# Patient Record
Sex: FEMALE | Race: WHITE | Employment: FULL TIME | URBAN - NONMETROPOLITAN AREA
[De-identification: names, ages, dates, MRNs, and addresses within clinical notes are randomized per-mention and may not be internally consistent; named-entity substitution may affect disease eponyms.]

---

## 2021-05-17 ENCOUNTER — HOSPITAL ENCOUNTER (EMERGENCY)
Age: 51
Discharge: HOME | End: 2021-05-18
Attending: STUDENT IN AN ORGANIZED HEALTH CARE EDUCATION/TRAINING PROGRAM
Payer: COMMERCIAL

## 2021-05-17 ENCOUNTER — APPOINTMENT (OUTPATIENT)
Dept: CT IMAGING | Age: 51
End: 2021-05-17
Payer: COMMERCIAL

## 2021-05-17 VITALS
OXYGEN SATURATION: 100 % | SYSTOLIC BLOOD PRESSURE: 123 MMHG | RESPIRATION RATE: 16 BRPM | HEIGHT: 64 IN | TEMPERATURE: 99.7 F | HEART RATE: 91 BPM | WEIGHT: 266 LBS | BODY MASS INDEX: 45.41 KG/M2 | DIASTOLIC BLOOD PRESSURE: 72 MMHG

## 2021-05-17 DIAGNOSIS — Z86.39 HISTORY OF DIABETES MELLITUS: ICD-10-CM

## 2021-05-17 DIAGNOSIS — E83.42 HYPOMAGNESEMIA: ICD-10-CM

## 2021-05-17 DIAGNOSIS — L03.314 CELLULITIS OF RIGHT GROIN: Primary | ICD-10-CM

## 2021-05-17 LAB
A/G RATIO: 1.4 (ref 1.1–2.2)
ALBUMIN SERPL-MCNC: 4.1 G/DL (ref 3.4–5)
ALP BLD-CCNC: 107 U/L (ref 40–129)
ALT SERPL-CCNC: 18 U/L (ref 10–40)
ANION GAP SERPL CALCULATED.3IONS-SCNC: 12 MMOL/L (ref 3–16)
AST SERPL-CCNC: 12 U/L (ref 15–37)
BASOPHILS ABSOLUTE: 0.1 K/UL (ref 0–0.2)
BASOPHILS RELATIVE PERCENT: 0.8 %
BILIRUB SERPL-MCNC: 0.4 MG/DL (ref 0–1)
BUN BLDV-MCNC: 13 MG/DL (ref 7–20)
CALCIUM SERPL-MCNC: 9.5 MG/DL (ref 8.3–10.6)
CHLORIDE BLD-SCNC: 97 MMOL/L (ref 99–110)
CO2: 26 MMOL/L (ref 21–32)
CREAT SERPL-MCNC: <0.5 MG/DL (ref 0.6–1.1)
EOSINOPHILS ABSOLUTE: 0.2 K/UL (ref 0–0.6)
EOSINOPHILS RELATIVE PERCENT: 1.6 %
GFR AFRICAN AMERICAN: >60
GFR NON-AFRICAN AMERICAN: >60
GLOBULIN: 3 G/DL
GLUCOSE BLD-MCNC: 247 MG/DL (ref 70–99)
HCT VFR BLD CALC: 42.8 % (ref 36–48)
HEMOGLOBIN: 14.6 G/DL (ref 12–16)
LYMPHOCYTES ABSOLUTE: 2 K/UL (ref 1–5.1)
LYMPHOCYTES RELATIVE PERCENT: 19 %
MAGNESIUM: 1.5 MG/DL (ref 1.8–2.4)
MCH RBC QN AUTO: 29 PG (ref 26–34)
MCHC RBC AUTO-ENTMCNC: 34 G/DL (ref 31–36)
MCV RBC AUTO: 85.4 FL (ref 80–100)
MONOCYTES ABSOLUTE: 0.6 K/UL (ref 0–1.3)
MONOCYTES RELATIVE PERCENT: 6.1 %
NEUTROPHILS ABSOLUTE: 7.5 K/UL (ref 1.7–7.7)
NEUTROPHILS RELATIVE PERCENT: 72.5 %
PDW BLD-RTO: 13 % (ref 12.4–15.4)
PLATELET # BLD: 212 K/UL (ref 135–450)
PMV BLD AUTO: 9.6 FL (ref 5–10.5)
POTASSIUM REFLEX MAGNESIUM: 3.5 MMOL/L (ref 3.5–5.1)
RBC # BLD: 5.01 M/UL (ref 4–5.2)
SODIUM BLD-SCNC: 135 MMOL/L (ref 136–145)
TOTAL PROTEIN: 7.1 G/DL (ref 6.4–8.2)
WBC # BLD: 10.3 K/UL (ref 4–11)

## 2021-05-17 PROCEDURE — 6360000004 HC RX CONTRAST MEDICATION: Performed by: STUDENT IN AN ORGANIZED HEALTH CARE EDUCATION/TRAINING PROGRAM

## 2021-05-17 PROCEDURE — 85025 COMPLETE CBC W/AUTO DIFF WBC: CPT

## 2021-05-17 PROCEDURE — 6360000002 HC RX W HCPCS: Performed by: STUDENT IN AN ORGANIZED HEALTH CARE EDUCATION/TRAINING PROGRAM

## 2021-05-17 PROCEDURE — 6370000000 HC RX 637 (ALT 250 FOR IP): Performed by: STUDENT IN AN ORGANIZED HEALTH CARE EDUCATION/TRAINING PROGRAM

## 2021-05-17 PROCEDURE — 99284 EMERGENCY DEPT VISIT MOD MDM: CPT

## 2021-05-17 PROCEDURE — 83735 ASSAY OF MAGNESIUM: CPT

## 2021-05-17 PROCEDURE — 36415 COLL VENOUS BLD VENIPUNCTURE: CPT

## 2021-05-17 PROCEDURE — 96365 THER/PROPH/DIAG IV INF INIT: CPT

## 2021-05-17 PROCEDURE — 80053 COMPREHEN METABOLIC PANEL: CPT

## 2021-05-17 PROCEDURE — 74177 CT ABD & PELVIS W/CONTRAST: CPT

## 2021-05-17 PROCEDURE — 87040 BLOOD CULTURE FOR BACTERIA: CPT

## 2021-05-17 RX ORDER — CEPHALEXIN 500 MG/1
500 CAPSULE ORAL 4 TIMES DAILY
Qty: 28 CAPSULE | Refills: 0 | Status: SHIPPED | OUTPATIENT
Start: 2021-05-17 | End: 2021-05-24

## 2021-05-17 RX ORDER — CEPHALEXIN 500 MG/1
500 CAPSULE ORAL ONCE
Status: COMPLETED | OUTPATIENT
Start: 2021-05-18 | End: 2021-05-17

## 2021-05-17 RX ORDER — MAGNESIUM SULFATE 1 G/100ML
1000 INJECTION INTRAVENOUS ONCE
Status: COMPLETED | OUTPATIENT
Start: 2021-05-17 | End: 2021-05-18

## 2021-05-17 RX ORDER — DOXYCYCLINE HYCLATE 100 MG
100 TABLET ORAL 2 TIMES DAILY
Qty: 14 TABLET | Refills: 0 | Status: SHIPPED | OUTPATIENT
Start: 2021-05-17 | End: 2021-05-24

## 2021-05-17 RX ORDER — DOXYCYCLINE HYCLATE 100 MG
100 TABLET ORAL ONCE
Status: COMPLETED | OUTPATIENT
Start: 2021-05-18 | End: 2021-05-17

## 2021-05-17 RX ORDER — LOSARTAN POTASSIUM 25 MG/1
TABLET ORAL
Status: ON HOLD | COMMUNITY
Start: 2021-05-02 | End: 2022-10-14 | Stop reason: HOSPADM

## 2021-05-17 RX ADMIN — DOXYCYCLINE HYCLATE 100 MG: 100 TABLET, COATED ORAL at 23:39

## 2021-05-17 RX ADMIN — IOPAMIDOL 75 ML: 755 INJECTION, SOLUTION INTRAVENOUS at 22:58

## 2021-05-17 RX ADMIN — CEPHALEXIN 500 MG: 500 CAPSULE ORAL at 23:39

## 2021-05-17 RX ADMIN — MAGNESIUM SULFATE HEPTAHYDRATE 1000 MG: 1 INJECTION, SOLUTION INTRAVENOUS at 23:13

## 2021-05-17 ASSESSMENT — PAIN SCALES - GENERAL: PAINLEVEL_OUTOF10: 1

## 2021-05-17 ASSESSMENT — PAIN DESCRIPTION - LOCATION: LOCATION: GROIN

## 2021-05-18 NOTE — ED NOTES
..Patient discharged to home, alert, oriented, skin warm, dry and pink. Denies needs and or questions.  Will follow up as directed, patient encouraged to return for worsening or new symptoms or other concerns       Justine Monaco RN  05/18/21 0023

## 2021-05-18 NOTE — ED PROVIDER NOTES
[facility] EMERGENCY DEPARTMENT      CHIEF COMPLAINT  Abscess (boil on right groin, started friday, hx of boild,  squeezed on / saturday, \" i felt it pop on the inside and now its a big mess\" )    HISTORY OF PRESENT ILLNESS  Erica Castellano is a 53 y.o. female  who presents to the ED complaining of right groin swelling and possible abscess. Patient states that symptoms have been going on for the past 2 to 3 days. She states that she has a history of multiple boils in that area and that she has a habit of popping them. She states that she tried to pop it several days ago and that she felt like it popped on the inside however nothing external drain out of it. She states that since then it has been worsened, has increased in size and also, painful. She denies any associated fevers or chills. She denies any other complaints or concerns. She does have a history of diabetes, states that she does not was consistent with checking her blood sugars or taking her insulin. Denies other complaints or concerns. No other complaints, modifying factors or associated symptoms. I have reviewed the following from the nursing documentation. Past Medical History:   Diagnosis Date    Anemia     Diabetes mellitus ([address]     Hypertension     Thyroid disease     PARTIAL REMOVAL FOR NODULES     Past Surgical History:   Procedure Laterality Date     SECTION      X2    CHOLECYSTECTOMY      HYSTERECTOMY  2016    total vaginal    THYROIDECTOMY Bilateral 2013    Subtotal thyroidectomy, right thyroid lobe, partial left thyroid lobe     Family History   Problem Relation Age of Onset    Diabetes Father     Cancer Paternal Grandmother         OVARION?      Social History     Socioeconomic History    Marital status:      Spouse name: Not on file    Number of children: Not on file    Years of education: Not on file    Highest education level: Not on file   Occupational History    Not on file Tobacco Use    Smoking status: Never Smoker   Substance and Sexual Activity    Alcohol use: No    Drug use: No    Sexual activity: Not on file   Other Topics Concern    Not on file   Social History Narrative    Not on file     Social Determinants of Health     Financial Resource Strain:     Difficulty of Paying Living Expenses:    Food Insecurity:     Worried About Running Out of Food in the Last Year:     [address] in the Last Year:    Transportation Needs:     Lack of Transportation (Medical):  Lack of Transportation (Non-Medical):    Physical Activity:     Days of Exercise per Week:     Minutes of Exercise per Session:    Stress:     Feeling of Stress :    Social Connections:     Frequency of Communication with Friends and Family:     Frequency of Social Gatherings with Friends and Family:     Attends Evangelical Services:     Active Member of Clubs or Organizations:     Attends Club or Organization Meetings:     Marital Status:    Intimate Partner Violence:     Fear of Current or Ex-Partner:     Emotionally Abused:     Physically Abused:     Sexually Abused:      Current Facility-Administered Medications   Medication Dose Route Frequency Provider Last Rate Last Admin    magnesium sulfate 1000 mg in dextrose 5% 100 mL IVPB  1,000 mg Intravenous Once Anitra Magana  mL/hr at 05/17/21 2313 1,000 mg at 05/17/21 2313     Current Outpatient Medications   Medication Sig Dispense Refill    losartan (COZAAR) 25 MG tablet       insulin aspart (NOVOLOG FLEXPEN) 100 UNIT/ML injection pen Inject 4 Units into the skin 3 times daily (before meals)      insulin glargine (LANTUS SOLOSTAR) 100 UNIT/ML injection pen Inject 12 Units into the skin nightly       No Known Allergies    REVIEW OF SYSTEMS  10 systems reviewed, pertinent positives per HPI otherwise noted to be negative.     PHYSICAL EXAM  BP (!) 158/88   Pulse 98   Temp 100.5 °F (38.1 °C) (Oral)   Resp 16   Ht 5' 4\" (1.626 26 21 - 32 mmol/L    Anion Gap 12 3 - 16    Glucose 247 (H) 70 - 99 mg/dL    BUN 13 7 - 20 mg/dL    CREATININE <0.5 (L) 0.6 - 1.1 mg/dL    GFR Non-African American >60 >60    GFR African American >60 >60    Calcium 9.5 8.3 - 10.6 mg/dL    Total Protein 7.1 6.4 - 8.2 g/dL    Albumin 4.1 3.4 - 5.0 g/dL    Albumin/Globulin Ratio 1.4 1.1 - 2.2    Total Bilirubin 0.4 0.0 - 1.0 mg/dL    Alkaline Phosphatase 107 40 - 129 U/L    ALT 18 10 - 40 U/L    AST 12 (L) 15 - 37 U/L    Globulin 3.0 g/dL   Magnesium   Result Value Ref Range    Magnesium 1.50 (L) 1.80 - 2.40 mg/dL     RADIOLOGY  CT ABDOMEN PELVIS W IV CONTRAST Additional Contrast? None   Final Result   Right groin cellulitis without evidence abscess or suspicious adenopathy           ED COURSE/MDM  Patient seen and evaluated. Old records reviewed. Labs and imaging reviewed and results discussed with patient. Patient is a 52-year-old female with history of diabetes, presenting with concerns for right groin pain and swelling for the past several days and possible boil. Full HPI as detailed above for upon arrival in the ED, vitals remarkable for fever but otherwise reassuring. Patient is not tachycardic, appears nontoxic and is resting comfortably no acute distress. Physical exam as detailed above, she does have an area of erythema and induration in her right inguinal area with no crepitus or obvious areas of induration for drainage. Given her vital sign abnormalities, history of diabetes in conjunction with these findings, CT abdomen pelvis was performed which showed right groin cellulitis without evidence of abscess or any other concerning symptoms at this time. Patient's magnesium level was also incidentally found to be slightly low at, she was given IV magnesium repletion here in the department. No other acute concern electrolyte abnormalities. No leukocytosis or anemia. Her glucose is elevated at 247 without evidence of acidosis.   Given her findings,

## 2021-05-22 LAB
BLOOD CULTURE, ROUTINE: NORMAL
CULTURE, BLOOD 2: NORMAL

## 2022-10-07 ENCOUNTER — APPOINTMENT (OUTPATIENT)
Dept: VASCULAR LAB | Age: 52
DRG: 234 | End: 2022-10-07
Attending: INTERNAL MEDICINE
Payer: COMMERCIAL

## 2022-10-07 ENCOUNTER — APPOINTMENT (OUTPATIENT)
Dept: GENERAL RADIOLOGY | Age: 52
End: 2022-10-07
Payer: COMMERCIAL

## 2022-10-07 ENCOUNTER — HOSPITAL ENCOUNTER (EMERGENCY)
Age: 52
Discharge: OTHER INSTITUTION | End: 2022-10-07
Attending: EMERGENCY MEDICINE
Payer: COMMERCIAL

## 2022-10-07 VITALS
SYSTOLIC BLOOD PRESSURE: 102 MMHG | TEMPERATURE: 97.6 F | HEIGHT: 64 IN | BODY MASS INDEX: 43.54 KG/M2 | OXYGEN SATURATION: 97 % | DIASTOLIC BLOOD PRESSURE: 63 MMHG | HEART RATE: 84 BPM | RESPIRATION RATE: 11 BRPM | WEIGHT: 255 LBS

## 2022-10-07 DIAGNOSIS — R73.9 HYPERGLYCEMIA: ICD-10-CM

## 2022-10-07 DIAGNOSIS — R77.8 ELEVATED TROPONIN: ICD-10-CM

## 2022-10-07 DIAGNOSIS — R07.9 CHEST PAIN, UNSPECIFIED TYPE: ICD-10-CM

## 2022-10-07 DIAGNOSIS — I21.4 NSTEMI (NON-ST ELEVATED MYOCARDIAL INFARCTION) (HCC): Primary | ICD-10-CM

## 2022-10-07 PROBLEM — I25.110 CORONARY ARTERY DISEASE INVOLVING NATIVE CORONARY ARTERY OF NATIVE HEART WITH UNSTABLE ANGINA PECTORIS (HCC): Status: ACTIVE | Noted: 2022-10-07

## 2022-10-07 PROBLEM — I25.5 ISCHEMIC CARDIOMYOPATHY: Status: ACTIVE | Noted: 2022-10-07

## 2022-10-07 LAB
A/G RATIO: 1.4 (ref 1.1–2.2)
ALBUMIN SERPL-MCNC: 4.3 G/DL (ref 3.4–5)
ALP BLD-CCNC: 140 U/L (ref 40–129)
ALT SERPL-CCNC: 15 U/L (ref 10–40)
ANION GAP SERPL CALCULATED.3IONS-SCNC: 12 MMOL/L (ref 3–16)
APTT: 27 SEC (ref 23–34.3)
APTT: 34.4 SEC (ref 23–34.3)
AST SERPL-CCNC: 11 U/L (ref 15–37)
BASOPHILS ABSOLUTE: 0 K/UL (ref 0–0.2)
BASOPHILS RELATIVE PERCENT: 0.6 %
BILIRUB SERPL-MCNC: 0.5 MG/DL (ref 0–1)
BUN BLDV-MCNC: 11 MG/DL (ref 7–20)
CALCIUM SERPL-MCNC: 9.9 MG/DL (ref 8.3–10.6)
CHLORIDE BLD-SCNC: 96 MMOL/L (ref 99–110)
CHP ED QC CHECK: NORMAL
CO2: 28 MMOL/L (ref 21–32)
CREAT SERPL-MCNC: <0.5 MG/DL (ref 0.6–1.1)
EKG ATRIAL RATE: 73 BPM
EKG ATRIAL RATE: 91 BPM
EKG DIAGNOSIS: NORMAL
EKG DIAGNOSIS: NORMAL
EKG P AXIS: 20 DEGREES
EKG P AXIS: 30 DEGREES
EKG P-R INTERVAL: 134 MS
EKG P-R INTERVAL: 148 MS
EKG Q-T INTERVAL: 368 MS
EKG Q-T INTERVAL: 402 MS
EKG QRS DURATION: 82 MS
EKG QRS DURATION: 86 MS
EKG QTC CALCULATION (BAZETT): 442 MS
EKG QTC CALCULATION (BAZETT): 452 MS
EKG R AXIS: -23 DEGREES
EKG R AXIS: -29 DEGREES
EKG T AXIS: -26 DEGREES
EKG T AXIS: -27 DEGREES
EKG VENTRICULAR RATE: 73 BPM
EKG VENTRICULAR RATE: 91 BPM
EOSINOPHILS ABSOLUTE: 0.1 K/UL (ref 0–0.6)
EOSINOPHILS RELATIVE PERCENT: 1.2 %
GFR AFRICAN AMERICAN: >60
GFR NON-AFRICAN AMERICAN: >60
GLUCOSE BLD-MCNC: 345 MG/DL
GLUCOSE BLD-MCNC: 345 MG/DL (ref 70–99)
GLUCOSE BLD-MCNC: 360 MG/DL (ref 70–99)
HCT VFR BLD CALC: 41.2 % (ref 36–48)
HEMOGLOBIN: 14.3 G/DL (ref 12–16)
LIPASE: 74 U/L (ref 13–60)
LYMPHOCYTES ABSOLUTE: 1.2 K/UL (ref 1–5.1)
LYMPHOCYTES RELATIVE PERCENT: 22.7 %
MCH RBC QN AUTO: 29.5 PG (ref 26–34)
MCHC RBC AUTO-ENTMCNC: 34.6 G/DL (ref 31–36)
MCV RBC AUTO: 85.4 FL (ref 80–100)
MONOCYTES ABSOLUTE: 0.3 K/UL (ref 0–1.3)
MONOCYTES RELATIVE PERCENT: 5.8 %
NEUTROPHILS ABSOLUTE: 3.7 K/UL (ref 1.7–7.7)
NEUTROPHILS RELATIVE PERCENT: 69.7 %
PDW BLD-RTO: 13.1 % (ref 12.4–15.4)
PERFORMED ON: ABNORMAL
PLATELET # BLD: 214 K/UL (ref 135–450)
PMV BLD AUTO: 9.5 FL (ref 5–10.5)
POTASSIUM REFLEX MAGNESIUM: 3.9 MMOL/L (ref 3.5–5.1)
RBC # BLD: 4.83 M/UL (ref 4–5.2)
SODIUM BLD-SCNC: 136 MMOL/L (ref 136–145)
TOTAL PROTEIN: 7.4 G/DL (ref 6.4–8.2)
TROPONIN: 0.07 NG/ML
TROPONIN: 0.09 NG/ML
TROPONIN: 0.21 NG/ML
WBC # BLD: 5.3 K/UL (ref 4–11)

## 2022-10-07 PROCEDURE — 80053 COMPREHEN METABOLIC PANEL: CPT

## 2022-10-07 PROCEDURE — 93010 ELECTROCARDIOGRAM REPORT: CPT | Performed by: INTERNAL MEDICINE

## 2022-10-07 PROCEDURE — 84484 ASSAY OF TROPONIN QUANT: CPT

## 2022-10-07 PROCEDURE — 6370000000 HC RX 637 (ALT 250 FOR IP): Performed by: STUDENT IN AN ORGANIZED HEALTH CARE EDUCATION/TRAINING PROGRAM

## 2022-10-07 PROCEDURE — 6360000002 HC RX W HCPCS: Performed by: EMERGENCY MEDICINE

## 2022-10-07 PROCEDURE — 85730 THROMBOPLASTIN TIME PARTIAL: CPT

## 2022-10-07 PROCEDURE — 96365 THER/PROPH/DIAG IV INF INIT: CPT

## 2022-10-07 PROCEDURE — 96366 THER/PROPH/DIAG IV INF ADDON: CPT

## 2022-10-07 PROCEDURE — 99284 EMERGENCY DEPT VISIT MOD MDM: CPT | Performed by: INTERNAL MEDICINE

## 2022-10-07 PROCEDURE — 93005 ELECTROCARDIOGRAM TRACING: CPT | Performed by: EMERGENCY MEDICINE

## 2022-10-07 PROCEDURE — 6370000000 HC RX 637 (ALT 250 FOR IP): Performed by: EMERGENCY MEDICINE

## 2022-10-07 PROCEDURE — 85025 COMPLETE CBC W/AUTO DIFF WBC: CPT

## 2022-10-07 PROCEDURE — 36415 COLL VENOUS BLD VENIPUNCTURE: CPT

## 2022-10-07 PROCEDURE — 83690 ASSAY OF LIPASE: CPT

## 2022-10-07 PROCEDURE — 99285 EMERGENCY DEPT VISIT HI MDM: CPT

## 2022-10-07 PROCEDURE — 71046 X-RAY EXAM CHEST 2 VIEWS: CPT

## 2022-10-07 RX ORDER — HEPARIN SODIUM 1000 [USP'U]/ML
2000 INJECTION, SOLUTION INTRAVENOUS; SUBCUTANEOUS PRN
Status: DISCONTINUED | OUTPATIENT
Start: 2022-10-07 | End: 2022-10-07 | Stop reason: HOSPADM

## 2022-10-07 RX ORDER — HEPARIN SODIUM 1000 [USP'U]/ML
4000 INJECTION, SOLUTION INTRAVENOUS; SUBCUTANEOUS ONCE
Status: COMPLETED | OUTPATIENT
Start: 2022-10-07 | End: 2022-10-07

## 2022-10-07 RX ORDER — HEPARIN SODIUM 10000 [USP'U]/100ML
950 INJECTION, SOLUTION INTRAVENOUS CONTINUOUS
Status: DISCONTINUED | OUTPATIENT
Start: 2022-10-07 | End: 2022-10-07 | Stop reason: HOSPADM

## 2022-10-07 RX ORDER — HEPARIN SODIUM 1000 [USP'U]/ML
4000 INJECTION, SOLUTION INTRAVENOUS; SUBCUTANEOUS PRN
Status: DISCONTINUED | OUTPATIENT
Start: 2022-10-07 | End: 2022-10-07 | Stop reason: HOSPADM

## 2022-10-07 RX ORDER — ATORVASTATIN CALCIUM 40 MG/1
80 TABLET, FILM COATED ORAL ONCE
Status: COMPLETED | OUTPATIENT
Start: 2022-10-07 | End: 2022-10-07

## 2022-10-07 RX ORDER — ASPIRIN 325 MG
325 TABLET ORAL ONCE
Status: COMPLETED | OUTPATIENT
Start: 2022-10-07 | End: 2022-10-07

## 2022-10-07 RX ORDER — LORAZEPAM 0.5 MG/1
0.5 TABLET ORAL ONCE
Status: COMPLETED | OUTPATIENT
Start: 2022-10-07 | End: 2022-10-07

## 2022-10-07 RX ADMIN — LORAZEPAM 0.5 MG: 0.5 TABLET ORAL at 04:42

## 2022-10-07 RX ADMIN — HEPARIN SODIUM 4000 UNITS: 1000 INJECTION INTRAVENOUS; SUBCUTANEOUS at 04:24

## 2022-10-07 RX ADMIN — ASPIRIN 325 MG: 325 TABLET ORAL at 04:20

## 2022-10-07 RX ADMIN — INSULIN HUMAN 10 UNITS: 100 INJECTION, SOLUTION PARENTERAL at 05:28

## 2022-10-07 RX ADMIN — METOPROLOL TARTRATE 25 MG: 25 TABLET, FILM COATED ORAL at 09:12

## 2022-10-07 RX ADMIN — ATORVASTATIN CALCIUM 80 MG: 40 TABLET, FILM COATED ORAL at 09:12

## 2022-10-07 RX ADMIN — HEPARIN SODIUM 950 UNITS/HR: 10000 INJECTION, SOLUTION INTRAVENOUS at 04:27

## 2022-10-07 ASSESSMENT — PAIN SCALES - GENERAL
PAINLEVEL_OUTOF10: 3
PAINLEVEL_OUTOF10: 0

## 2022-10-07 ASSESSMENT — PAIN - FUNCTIONAL ASSESSMENT: PAIN_FUNCTIONAL_ASSESSMENT: 0-10

## 2022-10-07 ASSESSMENT — PAIN DESCRIPTION - LOCATION
LOCATION: CHEST
LOCATION: CHEST

## 2022-10-07 ASSESSMENT — ENCOUNTER SYMPTOMS
RHINORRHEA: 0
COUGH: 0
VOMITING: 0
CHEST TIGHTNESS: 0
SHORTNESS OF BREATH: 0
DIARRHEA: 0
BACK PAIN: 0
ABDOMINAL PAIN: 0

## 2022-10-07 ASSESSMENT — PAIN DESCRIPTION - ORIENTATION: ORIENTATION: LEFT;RIGHT;MID

## 2022-10-07 ASSESSMENT — PAIN DESCRIPTION - FREQUENCY: FREQUENCY: INTERMITTENT

## 2022-10-07 ASSESSMENT — PAIN DESCRIPTION - DESCRIPTORS: DESCRIPTORS: PRESSURE;DISCOMFORT;TIGHTNESS

## 2022-10-07 ASSESSMENT — PAIN DESCRIPTION - PAIN TYPE: TYPE: ACUTE PAIN

## 2022-10-07 NOTE — ED NOTES
Room at [address] [place] [address]    Report number is [phone]    [address]  10/07/22 8157

## 2022-10-07 NOTE — ED NOTES
2089- Call placed to transfer center for [facility].      Jovon Watkins  10/07/22 0422

## 2022-10-07 NOTE — ED NOTES
Cath lab updated that APTT drawn at 1008 results still pending at time of transfer. Pt is gowned, alert, oriented, has patent PIV at time of transfer. Family is at bedside for transfer. Hep gtt continues at 9.5 ml/hr.  Cheryl Delacruz, RN       Cheryl Delacruz RN  10/07/22 1039

## 2022-10-07 NOTE — CONSULTS
Pharmacy to Manage Heparin Infusion per [facility]    Dx: ACS  Pt wt = 79.1 kg - adjusted. Baseline aPTT = Pending. Oral factor Xa-inhibitors may alter and elevate anti-Xa levels used for unfractionated heparin monitoring. As a result, anti-Xa monitoring is not accurate while Xa-inhibitor activity is detectable. Utilize aPTT monitoring when patient received an oral factor Xa-inhibitor (apixaban, betrixaban, edoxaban or rivaroxaban) within 72 hours prior to admission (please document last administration time). The goal is to allow a washout of oral factor Xa-inhibitors by using aPTT for 72 hours, then change to ant-Xa levels for UFH. Heparin (weight-based) Infusion: CAD/STEMI/NSTEMI/UA/AFib)   Heparin 60 units/kg IVP bolus (max 4,000 units) followed by Heparin infusion at 12 units/kg/hr (recommended max initial rate: 1000 units/hr). Recheck anti-Xa (unless aPTT being used) in 6 hours. Goal anti-Xa 0.3-0.7 IU/mL  Goal aPTT =  seconds.   Yola Steele, PharmD  10/7/2022 4:04 AM
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
## 2022-10-07 NOTE — ED PROVIDER NOTES
11:49 AM: I discussed the history, physical examination, laboratory and imaging studies, and treatment plan with Dr. Fernando Johnson. Chuck Mistry was signed out to me in stable condition. Please see Dr. Regine Cristina documentation for details of their history, physical, and laboratory studies. Upon re-examination, a summary of Keila Medrano's history, physical examination, and studies are as follows:      ED Course as of 10/10/22 1149   Fri Oct 07, 2022   0756 NSTEMI [ER]   5082 Repeat troponin had up trended to 0.21. [ER]   1848 Received call from cardiology who reviewed EKGs and did have specific concerns. They recommended p.o. metoprolol and Lipitor. They will see the patient and stated they would contact interventional cardiology. [ER]      ED Course User Index  [ER] MD Dr. Radha Holland evaluated the patient in the emergency department. He spoke to interventional radiology and decision made to plan for cardiac catheterization today. Attempting to get patient over to [facility] as quickly as possible. Dr. Radha Simmons did not feel the patient required air transport, stated that soon as possible ground transportation was appropriate. Patient remained hemodynamically stable in the emergency department and was transferred to [facility].      Mary Gallardo MD  10/10/22 0021

## 2022-10-07 NOTE — ED NOTES
5591- Dr. Jf Moyer returned the phone call and spoke to Dr. Farida Randhawa.      Elías Gunn  10/07/22 3647

## 2022-10-07 NOTE — CONSULTS
[address] [place] [address]  [phone]        Reason for Consultation/Chief Complaint: \"I have been having chest pain. \"  Consulted for abn EKG and chest pain    History of Present Illness:  Genaro Neves is a 47 y.o. patient who presented to [facility] 10/7/2022 with complaints of chest pain. She has PMH of anemia, DM x 20 years, HTN, s/p thyroidectomy. No past cardiac testing. Note she does NOT take any medications. Prescribed meds by PCP Dr. Star Fernandez including insulin but did not want to take. She c/o chest pain worsening over the past 4 months. Reports tightness across chest radiating into throat and shoulders; occurs randomly; lasted few minutes; no assoc symptoms. However, last 2 weeks pain has increased in frequency and intensity. She was awakened with CP from sleep that would not go away prompting ER visit. Note CXR was unremarkable. Note EKG NSR; ST elevation high lateral leads; inferior ST changes c/w possible ischemia. Second EKG similar findings. LABS: , K 3.9, Cl 96, BUN/Cr 11/ <0.5, Mg 1.5, ALT 18, AST 12, H/H 14.3/41. 2. Germania 0.09, 0.07, 0.21. Started on heparin gtt and given adult aspirin. Patient with no complaints of chest pain, SOB, palpitations, dizziness, edema, or orthopnea/PND. I have been asked to provide consultation regarding further management and testing. Past Medical History:   has a past medical history of Anemia, Diabetes mellitus ([address] Hypertension, and Thyroid disease. Surgical History:   has a past surgical history that includes Cholecystectomy;  section; Thyroidectomy (Bilateral, 2013); and Hysterectomy (2016). Social History:   reports that she has never smoked. She has never used smokeless tobacco. She reports that she does not drink alcohol and does not use drugs. Family History:  family history includes Cancer in her paternal grandmother; Diabetes in her father.      Home Medications:  Were reviewed and are listed in nursing record. and/or listed below  Prior to Admission medications    Medication Sig Start Date End Date Taking? Authorizing Provider   losartan (COZAAR) 25 MG tablet  5/2/21   Historical Provider, MD   insulin aspart (NOVOLOG) 100 UNIT/ML injection pen Inject 4 Units into the skin 3 times daily (before meals)  Patient not taking: Reported on 10/7/2022    Historical Provider, MD   insulin glargine (LANTUS;BASAGLAR) 100 UNIT/ML injection pen Inject 12 Units into the skin nightly  Patient not taking: Reported on 10/7/2022    Historical Provider, MD        Allergies:  Patient has no known allergies.      Review of Systems:   12 point ROS negative in all areas as listed below except as in [place]  Constitutional, EENT, Cardiovascular, pulmonary, GI, , Musculoskeletal, skin, neurological, hematological, endocrine, Psychiatric    Physical Examination:    Vitals:    10/07/22 0630   BP: 117/65   Pulse: 85   Resp: 15   Temp:    SpO2: 92%    Weight: 255 lb (115.7 kg)         General Appearance:  Alert, cooperative, no distress, appears stated age   Head:  Normocephalic, without obvious abnormality, atraumatic   Eyes:  PERRL, conjunctiva/corneas clear       Nose: Nares normal, no drainage or sinus tenderness   Throat: Lips, mucosa, and tongue normal   Neck: Supple, symmetrical, trachea midline, no adenopathy, thyroid: not enlarged, symmetric, no tenderness/mass/nodules, no carotid bruit or JVD       Lungs:   Clear to auscultation bilaterally, respirations unlabored   Chest Wall:  No tenderness or deformity   Heart:  Regular rate and rhythm, S1, S2 normal, no murmur, rub or gallop   Abdomen:   Soft, non-tender, bowel sounds active all four quadrants,  no masses, no organomegaly           Extremities: Extremities normal, atraumatic, no cyanosis or edema   Pulses: 2+ and symmetric   Skin: Skin color, texture, turgor normal, no rashes or lesions   Pysch: Normal mood and affect   Neurologic: Normal gross motor and sensory exam. Labs  CBC:   Lab Results   Component Value Date/Time    WBC 5.3 10/07/2022 03:03 AM    RBC 4.83 10/07/2022 03:03 AM    HGB 14.3 10/07/2022 03:03 AM    HCT 41.2 10/07/2022 03:03 AM    MCV 85.4 10/07/2022 03:03 AM    RDW 13.1 10/07/2022 03:03 AM     10/07/2022 03:03 AM     CMP:    Lab Results   Component Value Date/Time     10/07/2022 03:03 AM    K 3.9 10/07/2022 03:03 AM    CL 96 10/07/2022 03:03 AM    CO2 28 10/07/2022 03:03 AM    BUN 11 10/07/2022 03:03 AM    CREATININE <0.5 10/07/2022 03:03 AM    GFRAA >60 10/07/2022 03:03 AM    GFRAA >60 03/29/2010 10:39 AM    AGRATIO 1.4 10/07/2022 03:03 AM    LABGLOM >60 10/07/2022 03:03 AM    GLUCOSE 345 10/07/2022 05:23 AM    PROT 7.4 10/07/2022 03:03 AM    PROT 7.1 03/29/2010 10:39 AM    CALCIUM 9.9 10/07/2022 03:03 AM    BILITOT 0.5 10/07/2022 03:03 AM    ALKPHOS 140 10/07/2022 03:03 AM    AST 11 10/07/2022 03:03 AM    ALT 15 10/07/2022 03:03 AM     PT/INR:  No results found for: PTINR  Lab Results   Component Value Date    TROPONINI 0.21 (H) 10/07/2022       EKG:  I have reviewed EKG with the following interpretation:  Impression:  See HPI    Assessment  Shahram Zuñiga is a 47 y.o. patient who presented to [facility] 10/7/2022 with complaints of chest pain. She has PMH of anemia, DM x 20 years, HTN, s/p thyroidectomy. No past cardiac testing. Note she does NOT take any medications. Prescribed meds by PCP Dr. Galilea Barriga including insulin but did not want to take. She c/o chest pain worsening over the past 4 months. Reports tightness across chest radiating into throat and shoulders; occurs randomly; lasted few minutes; no assoc symptoms. However, last 2 weeks pain has increased in frequency and intensity. She was awakened with CP from sleep that would not go away prompting ER visit. Note CXR was unremarkable. Note EKG NSR; ST elevation high lateral leads; inferior ST changes c/w possible ischemia. Second EKG similar findings.   LABS: , K 3.9, Cl 96, BUN/Cr 11/ <0.5, Mg 1.5, ALT 18, AST 12, H/H 14.3/41. 2. Germania 0.09, 0.07, 0.21. Started on heparin gtt and given adult aspirin. Diagnosis of NSTEMI in middle-aged female with untreated DM, HTN for years. Recs:  EKG without definite criteria for STEMI but concerning lateral ST changes. Given Germania elevation and clinical story she definitely has ACS/NSTEMI. Continue heparin gtt and baby aspirin for AC. She received adult x 1 in ER. Start metoprolol 25mg BID and lipitor 80mg qd  Based on these findings I recommend left heart cath for definitive evaluation of coronary arteries. Risks, benefits, expectations, and alternative treatments were discussed. Questions appropriately answered. Keila Day agrees to proceed and verbalized understanding. I discussed case with interventional partner and will plan for LHC today. She is CP free now. Thank you for allowing to us to participate in the care or Keila Day. Further evaluation will be based upon the patient's clinical course and testing results.

## 2022-10-07 NOTE — ED NOTES
Report given to [facility] for transfer of care.      101 Danis Almodovar RN  10/07/22 3891

## 2022-10-08 ENCOUNTER — ANESTHESIA EVENT (OUTPATIENT)
Dept: OPERATING ROOM | Age: 52
DRG: 234 | End: 2022-10-08
Payer: COMMERCIAL

## 2022-10-08 PROBLEM — I25.10 CAD, MULTIPLE VESSEL: Status: ACTIVE | Noted: 2022-10-08

## 2022-10-09 ASSESSMENT — ENCOUNTER SYMPTOMS
COUGH: 0
VOMITING: 0
ABDOMINAL PAIN: 0
CHEST TIGHTNESS: 0
RHINORRHEA: 0
SHORTNESS OF BREATH: 0
BACK PAIN: 0
DIARRHEA: 0

## 2022-10-10 ENCOUNTER — APPOINTMENT (OUTPATIENT)
Dept: GENERAL RADIOLOGY | Age: 52
DRG: 234 | End: 2022-10-10
Attending: INTERNAL MEDICINE
Payer: COMMERCIAL

## 2022-10-10 ENCOUNTER — ANESTHESIA (OUTPATIENT)
Dept: OPERATING ROOM | Age: 52
DRG: 234 | End: 2022-10-10
Payer: COMMERCIAL

## 2022-10-10 PROCEDURE — 6360000002 HC RX W HCPCS: Performed by: ANESTHESIOLOGY

## 2022-10-10 PROCEDURE — 76942 ECHO GUIDE FOR BIOPSY: CPT | Performed by: ANESTHESIOLOGY

## 2022-10-10 PROCEDURE — 71045 X-RAY EXAM CHEST 1 VIEW: CPT

## 2022-10-10 PROCEDURE — 2580000003 HC RX 258: Performed by: ANESTHESIOLOGY

## 2022-10-10 PROCEDURE — 6360000002 HC RX W HCPCS: Performed by: NURSE PRACTITIONER

## 2022-10-10 PROCEDURE — 2500000003 HC RX 250 WO HCPCS: Performed by: ANESTHESIOLOGY

## 2022-10-10 PROCEDURE — 6370000000 HC RX 637 (ALT 250 FOR IP): Performed by: ANESTHESIOLOGY

## 2022-10-10 RX ORDER — BUPIVACAINE HYDROCHLORIDE 2.5 MG/ML
INJECTION, SOLUTION EPIDURAL; INFILTRATION; INTRACAUDAL
Status: COMPLETED | OUTPATIENT
Start: 2022-10-10 | End: 2022-10-10

## 2022-10-10 RX ORDER — ROCURONIUM BROMIDE 10 MG/ML
INJECTION, SOLUTION INTRAVENOUS PRN
Status: DISCONTINUED | OUTPATIENT
Start: 2022-10-10 | End: 2022-10-10 | Stop reason: SDUPTHER

## 2022-10-10 RX ORDER — SODIUM CHLORIDE, SODIUM LACTATE, POTASSIUM CHLORIDE, CALCIUM CHLORIDE 600; 310; 30; 20 MG/100ML; MG/100ML; MG/100ML; MG/100ML
INJECTION, SOLUTION INTRAVENOUS CONTINUOUS PRN
Status: DISCONTINUED | OUTPATIENT
Start: 2022-10-10 | End: 2022-10-10 | Stop reason: SDUPTHER

## 2022-10-10 RX ORDER — MIDAZOLAM HYDROCHLORIDE 1 MG/ML
INJECTION INTRAMUSCULAR; INTRAVENOUS PRN
Status: DISCONTINUED | OUTPATIENT
Start: 2022-10-10 | End: 2022-10-10 | Stop reason: SDUPTHER

## 2022-10-10 RX ORDER — SODIUM CHLORIDE 9 MG/ML
INJECTION, SOLUTION INTRAVENOUS CONTINUOUS PRN
Status: DISCONTINUED | OUTPATIENT
Start: 2022-10-10 | End: 2022-10-10 | Stop reason: SDUPTHER

## 2022-10-10 RX ORDER — AMINOCAPROIC ACID 250 MG/ML
INJECTION, SOLUTION INTRAVENOUS PRN
Status: DISCONTINUED | OUTPATIENT
Start: 2022-10-10 | End: 2022-10-10 | Stop reason: SDUPTHER

## 2022-10-10 RX ORDER — ETOMIDATE 2 MG/ML
INJECTION INTRAVENOUS PRN
Status: DISCONTINUED | OUTPATIENT
Start: 2022-10-10 | End: 2022-10-10 | Stop reason: SDUPTHER

## 2022-10-10 RX ORDER — CALCIUM CHLORIDE 100 MG/ML
INJECTION INTRAVENOUS; INTRAVENTRICULAR PRN
Status: DISCONTINUED | OUTPATIENT
Start: 2022-10-10 | End: 2022-10-10 | Stop reason: SDUPTHER

## 2022-10-10 RX ORDER — PHENYLEPHRINE HCL IN 0.9% NACL 1 MG/10 ML
SYRINGE (ML) INTRAVENOUS PRN
Status: DISCONTINUED | OUTPATIENT
Start: 2022-10-10 | End: 2022-10-10 | Stop reason: SDUPTHER

## 2022-10-10 RX ORDER — HEPARIN SODIUM 1000 [USP'U]/ML
INJECTION, SOLUTION INTRAVENOUS; SUBCUTANEOUS PRN
Status: DISCONTINUED | OUTPATIENT
Start: 2022-10-10 | End: 2022-10-10 | Stop reason: SDUPTHER

## 2022-10-10 RX ORDER — PROTAMINE SULFATE 10 MG/ML
INJECTION, SOLUTION INTRAVENOUS PRN
Status: DISCONTINUED | OUTPATIENT
Start: 2022-10-10 | End: 2022-10-10 | Stop reason: SDUPTHER

## 2022-10-10 RX ORDER — FENTANYL CITRATE 0.05 MG/ML
INJECTION, SOLUTION INTRAMUSCULAR; INTRAVENOUS PRN
Status: DISCONTINUED | OUTPATIENT
Start: 2022-10-10 | End: 2022-10-10 | Stop reason: SDUPTHER

## 2022-10-10 RX ORDER — KETAMINE HYDROCHLORIDE 100 MG/ML
INJECTION, SOLUTION INTRAMUSCULAR; INTRAVENOUS PRN
Status: DISCONTINUED | OUTPATIENT
Start: 2022-10-10 | End: 2022-10-10 | Stop reason: SDUPTHER

## 2022-10-10 RX ADMIN — MIDAZOLAM HYDROCHLORIDE 4 MG: 2 INJECTION, SOLUTION INTRAMUSCULAR; INTRAVENOUS at 11:25

## 2022-10-10 RX ADMIN — MIDAZOLAM HYDROCHLORIDE 4 MG: 2 INJECTION, SOLUTION INTRAMUSCULAR; INTRAVENOUS at 07:44

## 2022-10-10 RX ADMIN — ROCURONIUM BROMIDE 100 MG: 10 SOLUTION INTRAVENOUS at 07:54

## 2022-10-10 RX ADMIN — KETAMINE HYDROCHLORIDE 50 MG: 100 INJECTION INTRAMUSCULAR; INTRAVENOUS at 07:54

## 2022-10-10 RX ADMIN — PROTAMINE SULFATE 450 MG: 10 INJECTION, SOLUTION INTRAVENOUS at 11:08

## 2022-10-10 RX ADMIN — DEXTROSE MONOHYDRATE 1 MCG/MIN: 50 INJECTION, SOLUTION INTRAVENOUS at 11:33

## 2022-10-10 RX ADMIN — Medication 1500 MG: at 08:16

## 2022-10-10 RX ADMIN — BUPIVACAINE HYDROCHLORIDE 20 ML: 2.5 INJECTION, SOLUTION EPIDURAL; INFILTRATION; INTRACAUDAL; PERINEURAL at 08:04

## 2022-10-10 RX ADMIN — SUGAMMADEX 200 MG: 100 INJECTION, SOLUTION INTRAVENOUS at 12:00

## 2022-10-10 RX ADMIN — SODIUM CHLORIDE: 9 INJECTION, SOLUTION INTRAVENOUS at 08:04

## 2022-10-10 RX ADMIN — Medication 100 MCG: at 11:35

## 2022-10-10 RX ADMIN — HEPARIN SODIUM 46000 UNITS: 1000 INJECTION, SOLUTION INTRAVENOUS; SUBCUTANEOUS at 09:34

## 2022-10-10 RX ADMIN — CALCIUM CHLORIDE 1 G: 100 INJECTION, SOLUTION INTRAVENOUS at 11:08

## 2022-10-10 RX ADMIN — SODIUM CHLORIDE 0.5 MCG/KG/HR: 9 INJECTION, SOLUTION INTRAVENOUS at 11:05

## 2022-10-10 RX ADMIN — SODIUM CHLORIDE, SODIUM LACTATE, POTASSIUM CHLORIDE, AND CALCIUM CHLORIDE: .6; .31; .03; .02 INJECTION, SOLUTION INTRAVENOUS at 07:44

## 2022-10-10 RX ADMIN — AMINOCAPROIC ACID 1 G/HR: 250 INJECTION, SOLUTION INTRAVENOUS at 08:21

## 2022-10-10 RX ADMIN — ROCURONIUM BROMIDE 50 MG: 10 SOLUTION INTRAVENOUS at 09:17

## 2022-10-10 RX ADMIN — KETAMINE HYDROCHLORIDE 50 MG: 100 INJECTION INTRAMUSCULAR; INTRAVENOUS at 11:25

## 2022-10-10 RX ADMIN — FENTANYL CITRATE 250 MCG: 50 INJECTION, SOLUTION INTRAMUSCULAR; INTRAVENOUS at 07:54

## 2022-10-10 RX ADMIN — SODIUM CHLORIDE 6 UNITS/HR: 9 INJECTION, SOLUTION INTRAVENOUS at 08:36

## 2022-10-10 RX ADMIN — INSULIN HUMAN 6 UNITS: 100 INJECTION, SOLUTION PARENTERAL at 08:36

## 2022-10-10 RX ADMIN — AMINOCAPROIC ACID 5000 MG: 250 INJECTION, SOLUTION INTRAVENOUS at 08:15

## 2022-10-10 RX ADMIN — FENTANYL CITRATE 100 MCG: 50 INJECTION, SOLUTION INTRAMUSCULAR; INTRAVENOUS at 11:14

## 2022-10-10 RX ADMIN — CEFAZOLIN 2000 MG: 10 INJECTION, POWDER, FOR SOLUTION INTRAVENOUS at 07:59

## 2022-10-10 RX ADMIN — ETOMIDATE 20 MG: 2 INJECTION INTRAVENOUS at 07:54

## 2022-10-10 RX ADMIN — FENTANYL CITRATE 50 MCG: 50 INJECTION, SOLUTION INTRAMUSCULAR; INTRAVENOUS at 11:19

## 2022-10-10 RX ADMIN — FENTANYL CITRATE 100 MCG: 50 INJECTION, SOLUTION INTRAMUSCULAR; INTRAVENOUS at 09:08

## 2022-10-10 RX ADMIN — FENTANYL CITRATE 150 MCG: 50 INJECTION, SOLUTION INTRAMUSCULAR; INTRAVENOUS at 08:39

## 2022-10-10 RX ADMIN — INSULIN HUMAN 6 UNITS: 100 INJECTION, SOLUTION PARENTERAL at 10:38

## 2022-10-10 RX ADMIN — ROCURONIUM BROMIDE 50 MG: 10 SOLUTION INTRAVENOUS at 11:06

## 2022-10-10 NOTE — ANESTHESIA PROCEDURE NOTES
Procedure Performed: JOHN       Start Time:  10/10/2022 8:16 AM       End Time:   10/10/2022 8:26 AM    Preanesthesia Checklist:  Patient identified, IV assessed, risks and benefits discussed, monitors and equipment assessed, procedure being performed at surgeon's request and anesthesia consent obtained. General Procedure Information  Diagnostic Indications for Echo:  hemodynamic monitoring  Physician Requesting Echo: Roberto Cortés MD  Location performed:  OR  Intubated  Bite block placed  Heart visualized  Probe Insertion:  Easy  Probe Type:  3D  Modalities:  2D only    Echocardiographic and Doppler Measurements    Ventricles    Right Ventricle:  Cavity size normal.  Hypertrophy not present. Thrombus not present. Left Ventricle:  Cavity size normal.  Hypertrophy not present. Thrombus not present. Valves    Aortic Valve: Annulus normal.  Stenosis not present. Regurgitation none. Leaflets normal.  Leaflet motions normal.      Mitral Valve: Annulus normal.  Stenosis not present. Regurgitation mild. Leaflets normal.  Leaflet motions normal.      Tricuspid Valve: Annulus normal.  Stenosis not present. Regurgitation none. Leaflets normal.  Leaflet motions normal.    Pulmonic Valve: Annulus normal.  Stenosis not present. Regurgitation none. Aorta    Ascending Aorta:  Size normal.    Aortic Arch:  Size normal.    Descending Aorta:  Size normal.        Atria    Right Atrium:  Size normal.  Spontaneous echo contrast not present. Thrombus not present. Tumor not present. Device not present. Left Atrium:  Size normal.  Spontaneous echo contrast not present. Thrombus not present. Tumor not present. Device not present.     Left atrial appendage normal.      Septa    Atrial Septum:  Intra-atrial septal morphology normal.      Ventricular Septum:  Intra-ventricular septum morphology normal.          Other Findings  Pericardium:  pericardial effusion  Pleural Effusion:  none  Pulmonary Arteries:  normal      Anesthesia Information  Performed Personally    Post Intervention Follow-up Study  Aortic Function: unchangedMitral Function: unchangedTricuspid Function: unchangedNoneComments: EF 55%

## 2022-10-10 NOTE — ANESTHESIA PROCEDURE NOTES
Peripheral Block    Patient location during procedure: OR  Reason for block: post-op pain management and at surgeon's request  Start time: 10/10/2022 8:04 AM  End time: 10/10/2022 8:07 AM  Staffing  Performed: anesthesiologist   Anesthesiologist: Raza Diaz MD  Preanesthetic Checklist  Completed: patient identified, IV checked, site marked, risks and benefits discussed, surgical/procedural consents, equipment checked, pre-op evaluation, timeout performed, anesthesia consent given, oxygen available and monitors applied/VS acknowledged  Peripheral Block   Patient position: sitting  Prep: ChloraPrep  Provider prep: mask and sterile gloves  Patient monitoring: cardiac monitor, continuous pulse ox, frequent blood pressure checks and IV access  Block type: PECS II  Laterality: bilateral  Injection technique: single-shot  Guidance: ultrasound guided  Local infiltration: lidocaine  Infiltration strength: 1 %  Local infiltration: lidocaine  Dose: 3 mL    Needle   Needle type: insulated echogenic nerve stimulator needle   Needle gauge: 21 G  Needle localization: ultrasound guidance  Needle length: 10 cm  Assessment   Injection assessment: negative aspiration for heme, no paresthesia on injection and local visualized surrounding nerve on ultrasound  Paresthesia pain: none  Slow fractionated injection: yes  Hemodynamics: stable  Real-time US image taken/store: yes  Outcomes: uncomplicated and patient tolerated procedure well    Additional Notes        Medications Administered  bupivacaine (PF) 0.25 % - Perineural   20 mL - 10/10/2022 8:04:00 AM  bupivacaine liposome 1.3 % - Perineural   20 mL - 10/10/2022 8:04:00 AM

## 2022-10-10 NOTE — ANESTHESIA PROCEDURE NOTES
Central Venous Line:    A central venous line was placed using ultrasound guidance and surface landmarks, in the OR for the following indication(s): central venous access and CVP monitoring. 10/10/2022 8:00 AM10/10/2022 8:04 AM    Sterility preparation included the following: hand hygiene performed prior to procedure, maximum sterile barriers used and sterile technique used to drape from head to toe. The patient was placed in Trendelenburg position. The right internal jugular vein was prepped. The site was prepped with Chloraprep. A 9 Fr (size), 20 (length), introducer triple lumen was placed. During the procedure, the following specific steps were taken: target vein identified, needle advanced into vein and blood aspirated and guidewire advanced into vein. During the procedure the patient experienced: patient tolerated procedure well with no complications and EBL < 5mL.       Outcomes: uncomplicatedno  Anesthesia type: general..No  Staffing  Performed: Anesthesiologist   Anesthesiologist: Nat Mclain MD  Preanesthetic Checklist  Completed: patient identified, timeout performed and monitors applied/VS acknowledged

## 2022-10-10 NOTE — ANESTHESIA POSTPROCEDURE EVALUATION
Department of Anesthesiology  Postprocedure Note    Patient: Venkatesh Serrato  MRN: [id number]  YOB: 1970  Date of evaluation: 10/10/2022      Procedure Summary     Date: 10/10/22 Room / Location: [address] / [facility]    Anesthesia Start: 4479 Anesthesia Stop: 1766    Procedure: CORONARY ARTERY BYPASS GRAFT TIMES THREE WITH POSSIBLE LEFT ATRIAL APPENDAGE CLIP PLACEMENT, JOHN, TEMPORARY PACING WIRES,  INTERNAL MAMMARY ARTERY AND SAPHNEOUS VEIN GRAFT, EDARTERECTOMY, BILATERAL PECTORALIS NERVE BLOCK FOR PAIN CONTROL (Chest) Diagnosis:       Coronary artery disease, unspecified vessel or lesion type, unspecified whether angina present, unspecified whether native or transplanted heart      (Coronary artery disease, unspecified vessel or lesion type, unspecified whether angina present, unspecified whether native or transplanted heart [I25.10])    Surgeons: Addy Gottlieb MD Responsible Provider: Caridad Casarez MD    Anesthesia Type: general ASA Status: 4          Anesthesia Type: No value filed.     Micah Phase I:      Micah Phase II:        Anesthesia Post Evaluation    Patient location during evaluation: ICU  Patient participation: complete - patient cannot participate  Level of consciousness: awake and alert and sedated and ventilated  Pain score: 0  Airway patency: patent  Nausea & Vomiting: no nausea and no vomiting  Complications: no  Cardiovascular status: blood pressure returned to baseline  Respiratory status: acceptable  Hydration status: stable

## 2022-10-10 NOTE — ANESTHESIA PRE PROCEDURE
Department of Anesthesiology  Preprocedure Note       Name:  Giovanni Raymundo   Age:  47 y.o.  :  1970                                          MRN:  [id number]         Date:  10/10/2022      Surgeon: Lev Zapata):  Joseph Beckham MD    Procedure: Procedure(s):  CORONARY ARTERY BYPASS GRAFT TIMES FOUR WITH POSSIBLE LEFT ATRIAL APPENDAGE CLIP PLACEMENT, JOHN, TEMPORARY PACING WIRES,  INTERNAL MAMMARY ARTERY AND SAPHNEOUS VEIN GRAFT, BILATERAL PECTORALIS NERVE BLOCK FOR PAIN CONTROL    Medications prior to admission:   Prior to Admission medications    Medication Sig Start Date End Date Taking?  Authorizing Provider   losartan (COZAAR) 25 MG tablet  21   Historical Provider, MD   insulin aspart (NOVOLOG) 100 UNIT/ML injection pen Inject 4 Units into the skin 3 times daily (before meals)  Patient not taking: No sig reported    Historical Provider, MD   insulin glargine (LANTUS;BASAGLAR) 100 UNIT/ML injection pen Inject 12 Units into the skin nightly  Patient not taking: No sig reported    Historical Provider, MD       Current medications:    Current Facility-Administered Medications   Medication Dose Route Frequency Provider Last Rate Last Admin    [place] Nido Cardioplegic Solution   Perfusion Once Joseph Beckham MD        sodium chloride 0.9 % 25 mL with bupivacaine (MARCAINE) 25 mL, bupivacaine liposome (EXPAREL) 20 mL    PRN Francis Scott MD   70 mL at 10/10/22 0635    sodium chloride 0.9 % 40 mL with papaverine 60 mg    PRN Francis Bravo MD   2 mL at 10/10/22 0640    norepinephrine (LEVOPHED) 16 mg in dextrose 5 % 250 mL infusion  1-100 mcg/min IntraVENous Continuous Jasson James MD        insulin regular (HUMULIN R;NOVOLIN R) 100 Units in sodium chloride 0.9 % 100 mL infusion  1-50 Units/hr IntraVENous Continuous Jasson James MD        dexmedetomidine (PRECEDEX) 400 mcg in sodium chloride 0.9 % 100 mL infusion  0.1-1.5 mcg/kg/hr IntraVENous Continuous Jasson James MD        perflutren lipid microspheres (DEFINITY) injection 1.65 mg  1.5 mL IntraVENous ONCE PRN Hermelinda Velasquez MD        heparin (porcine) injection 4,000 Units  4,000 Units IntraVENous PRN Hermelinda Velasquez MD        heparin (porcine) injection 2,000 Units  2,000 Units IntraVENous PRN Hermelinda Velasquez MD        heparin 25,000 units in dextrose 5% 250 mL (premix) infusion  1,430 Units/hr IntraVENous Continuous Hermelinda Velasquez MD   Stopped at 10/10/22 0440    insulin regular (HUMULIN R;NOVOLIN R) 100 Units in sodium chloride 0.9 % 100 mL infusion  0.5 Units/hr IntraVENous On Call Wojciech Stella, APRN - CNP        lactated ringers infusion   IntraVENous Continuous [place] Stella, APRN - CNP 50 mL/hr at 10/10/22 0517 New Bag at 10/10/22 0517    mupirocin (BACTROBAN) 2 % ointment   Nasal BID Wojciceh Stella, APRN - CNP   Given at 10/09/22 2213    chlorhexidine (HIBICLENS) 4 % liquid   Topical See Admin Instructions [place] Stella, APRN - CNP   Given at 10/09/22 2212    ceFAZolin (ANCEF) 2000 mg in dextrose 5 % 100 mL IVPB  2,000 mg IntraVENous On Call to [address], APRN - CNP        vancomycin 1500 mg in dextrose 5% 300 mL IVPB  1,500 mg IntraVENous On Call to [address], APRN - CNP        insulin glargine (LANTUS) injection vial 12 Units  12 Units SubCUTAneous Nightly Keyla Singh MD   12 Units at 10/09/22 2208    insulin lispro (HUMALOG) injection vial 0-4 Units  0-4 Units SubCUTAneous TID WC Keyla Singh MD   1 Units at 10/09/22 1756    insulin lispro (HUMALOG) injection vial 0-4 Units  0-4 Units SubCUTAneous Nightly Keyla Singh MD   4 Units at 10/08/22 2117    glucose chewable tablet 16 g  4 tablet Oral PRN Keyla Singh MD        dextrose bolus 10% 125 mL  125 mL IntraVENous PRN Keyla Singh MD        Or    dextrose bolus 10% 250 mL  250 mL IntraVENous PRN Keyla Singh MD        glucagon (rDNA) injection 1 mg  1 mg IntraMUSCular PRN MD Dmitri Putnam dextrose 10 % infusion   IntraVENous Continuous PRN Corby Ball MD        aspirin EC tablet 81 mg  81 mg Oral Daily Corby Ball MD   81 mg at 10/09/22 6917    atorvastatin (LIPITOR) tablet 80 mg  80 mg Oral Nightly Corby Ball MD   80 mg at 10/09/22 2218       Allergies:  No Known Allergies    Problem List:    Patient Active Problem List   Diagnosis Code    NSTEMI (non-ST elevated myocardial infarction) ([facility] I21.4    Coronary artery disease involving native coronary artery of native heart with unstable angina pectoris (HCC) I25.110    Ischemic cardiomyopathy I25.5    CAD, multiple vessel I25.10       Past Medical History:        Diagnosis Date    Anemia     Diabetes mellitus ([facility]     Hypertension     Thyroid disease     PARTIAL REMOVAL FOR NODULES       Past Surgical History:        Procedure Laterality Date     SECTION      X2    CHOLECYSTECTOMY      HYSTERECTOMY (CERVIX STATUS UNKNOWN)  2016    total vaginal    THYROIDECTOMY Bilateral 2013    Subtotal thyroidectomy, right thyroid lobe, partial left thyroid lobe       Social History:    Social History     Tobacco Use    Smoking status: Never    Smokeless tobacco: Never   Substance Use Topics    Alcohol use:  No                                Counseling given: Not Answered      Vital Signs (Current):   Vitals:    10/10/22 0400 10/10/22 0500 10/10/22 0600 10/10/22 0700   BP: 100/64 (!) 93/57 116/66 103/67   Pulse: 72 72 62 67   Resp:  18 18 18   Temp:   98.2 °F (36.8 °C)    TempSrc:   Oral    SpO2: 92%  100%    Weight:       Height:                                                  BP Readings from Last 3 Encounters:   10/10/22 103/67   10/07/22 102/63   21 123/72       NPO Status:                                                                                 BMI:   Wt Readings from Last 3 Encounters:   10/09/22 256 lb 6.3 oz (116.3 kg)   10/07/22 255 lb (115.7 kg)   21 266 lb (120.7 kg) Body mass index is 44.01 kg/m².     CBC:   Lab Results   Component Value Date/Time    WBC 6.3 10/10/2022 03:24 AM    RBC 4.84 10/10/2022 03:24 AM    HGB 14.1 10/10/2022 03:24 AM    HCT 43.0 10/10/2022 03:24 AM    MCV 88.9 10/10/2022 03:24 AM    RDW 13.5 10/10/2022 03:24 AM     10/10/2022 03:24 AM       CMP:   Lab Results   Component Value Date/Time     10/10/2022 03:24 AM    K 3.8 10/10/2022 03:24 AM    K 3.9 10/07/2022 03:03 AM     10/10/2022 03:24 AM    CO2 23 10/10/2022 03:24 AM    BUN 17 10/10/2022 03:24 AM    CREATININE <0.5 10/10/2022 03:24 AM    GFRAA >60 10/10/2022 03:24 AM    GFRAA >60 03/29/2010 10:39 AM    AGRATIO 1.4 10/07/2022 03:03 AM    LABGLOM >60 10/10/2022 03:24 AM    GLUCOSE 228 10/10/2022 03:24 AM    PROT 5.9 10/10/2022 03:24 AM    PROT 7.1 03/29/2010 10:39 AM    CALCIUM 8.6 10/10/2022 03:24 AM    BILITOT 0.6 10/10/2022 03:24 AM    ALKPHOS 83 10/10/2022 03:24 AM    AST 14 10/10/2022 03:24 AM    ALT 14 10/10/2022 03:24 AM       POC Tests:   Recent Labs     10/09/22  2220   POCGLU 224*       Coags:   Lab Results   Component Value Date/Time    PROTIME 13.4 10/10/2022 03:24 AM    INR 1.02 10/10/2022 03:24 AM    APTT 34.4 10/07/2022 10:14 AM       HCG (If Applicable):   Lab Results   Component Value Date    PREGTESTUR Negative 12/29/2016        ABGs: No results found for: PHART, PO2ART, YLT7OWY, PSJ5LOD, BEART, [id number]     Type & Screen (If Applicable):  No results found for: LABABO, LABRH    Drug/Infectious Status (If Applicable):  No results found for: HIV, HEPCAB    COVID-19 Screening (If Applicable): No results found for: COVID19        Anesthesia Evaluation  Patient summary reviewed and Nursing notes reviewed  Airway: Mallampati: I  TM distance: <3 FB   Neck ROM: full  Mouth opening: > = 3 FB   Dental: normal exam         Pulmonary:Negative Pulmonary ROS and normal exam  breath sounds clear to auscultation                             Cardiovascular:    (+) hypertension:,

## 2022-10-11 ENCOUNTER — APPOINTMENT (OUTPATIENT)
Dept: GENERAL RADIOLOGY | Age: 52
DRG: 234 | End: 2022-10-11
Attending: INTERNAL MEDICINE
Payer: COMMERCIAL

## 2022-10-11 PROCEDURE — 71045 X-RAY EXAM CHEST 1 VIEW: CPT

## 2022-10-12 ENCOUNTER — APPOINTMENT (OUTPATIENT)
Dept: GENERAL RADIOLOGY | Age: 52
DRG: 234 | End: 2022-10-12
Attending: INTERNAL MEDICINE
Payer: COMMERCIAL

## 2022-10-12 PROCEDURE — 71045 X-RAY EXAM CHEST 1 VIEW: CPT

## 2022-10-13 ENCOUNTER — APPOINTMENT (OUTPATIENT)
Dept: GENERAL RADIOLOGY | Age: 52
DRG: 234 | End: 2022-10-13
Attending: INTERNAL MEDICINE
Payer: COMMERCIAL

## 2022-10-13 PROCEDURE — 71045 X-RAY EXAM CHEST 1 VIEW: CPT

## 2022-10-14 PROBLEM — I21.4 NSTEMI (NON-ST ELEVATED MYOCARDIAL INFARCTION) (HCC): Status: RESOLVED | Noted: 2022-10-07 | Resolved: 2022-10-14

## 2022-10-17 ENCOUNTER — HOSPITAL ENCOUNTER (EMERGENCY)
Age: 52
Discharge: HOME | End: 2022-10-17
Attending: EMERGENCY MEDICINE
Payer: COMMERCIAL

## 2022-10-17 ENCOUNTER — APPOINTMENT (OUTPATIENT)
Dept: VASCULAR LAB | Age: 52
End: 2022-10-17
Payer: COMMERCIAL

## 2022-10-17 VITALS
SYSTOLIC BLOOD PRESSURE: 127 MMHG | OXYGEN SATURATION: 97 % | TEMPERATURE: 98.2 F | HEART RATE: 93 BPM | DIASTOLIC BLOOD PRESSURE: 93 MMHG | WEIGHT: 255 LBS | BODY MASS INDEX: 43.54 KG/M2 | HEIGHT: 64 IN | RESPIRATION RATE: 16 BRPM

## 2022-10-17 DIAGNOSIS — R20.2 PARESTHESIA: Primary | ICD-10-CM

## 2022-10-17 PROCEDURE — 99284 EMERGENCY DEPT VISIT MOD MDM: CPT

## 2022-10-17 PROCEDURE — 93971 EXTREMITY STUDY: CPT

## 2022-10-17 ASSESSMENT — PAIN DESCRIPTION - LOCATION: LOCATION: ARM

## 2022-10-17 ASSESSMENT — PAIN - FUNCTIONAL ASSESSMENT: PAIN_FUNCTIONAL_ASSESSMENT: 0-10

## 2022-10-17 ASSESSMENT — PAIN SCALES - GENERAL: PAINLEVEL_OUTOF10: 0

## 2022-10-17 ASSESSMENT — PAIN DESCRIPTION - ORIENTATION: ORIENTATION: LEFT

## 2022-10-17 NOTE — CONSULTS
1231 - called [facility] cardiothoracic surgery per consult  Re: Left arm pain, swelling, numbness 4 days postop CABG no DVT  1236 - NP Marivel working with Dr Gutierrez Gave on the line to speak with  [address] [place] [address]

## 2022-10-17 NOTE — PROGRESS NOTES
CVTS Thoracic Progress Note:          CC:  LUE Numbness/tingling     Pt known to service: \"The patient is a 47 y.o. female with significant past medical history of anemia, diabetes (poorly controlled 2/2 non-compliance), and HTN  who has been having chest tightness for the past few months. Her chest tightness worsened to being constant which prompted her to be evaluated in the ED. Cardiology was consulted. During her work up she was found to have ACS/Non-STEMI and was transferred to [facility] to have a cardiac catheterization. Cardiac catheterization revealed multivessel CAD. We have been consulted for consideration of surgical revascularization. Hospital Course: The patient underwent CABG x3 with left atrial appendage clip on 10/10. She followed typical postoperative course and progressed very well. Her pain was controlled. She was participating with PT/OT and ambulating without difficulty. She was tolerating p.o. intake. In regards to her diabetes and glucose control, she was instructed to follow-up closely with her PCP, preferably within the next 1 to 2 weeks. She was given a refill on her home insulin at the time of discharge. The patient was discharged on 10/14/22 with Royal Thompson, home PT/OT. \"    Ms. Darcy Terrell presents today with complaints of tingling and numbness to LUE. She states she fell asleep last night unintentionally with her left arm above her head. She developed some soreness in the left upper arm and then noticed some numbness/tingling sensation. Denies weakness. She denies any sternal clicking or pain. LUE venous duplex performed in ED and revealed no evidence of DVT. Obj:    Blood pressure (!) 127/93, pulse 93, temperature 98.2 °F (36.8 °C), temperature source Oral, resp. rate 16, height 5' 4\" (1.626 m), weight 255 lb (115.7 kg), last menstrual period 12/13/2016, SpO2 97 %, not currently breastfeeding.     Alert, oriented    Strong and equal strength to BUE and BLE    Strong bilateral radial pulses    Cap refill < 3 seconds to BUE    Sensation intact to LUE    Respirations easy, unlabored    Sternotomy healing well without surrounding erythema, drainage   Sternum is stable without clicking or crepitus    Trace lower extremity edema       Diagnostics:   No results for input(s): WBC, HGB, HCT, PLT in the last 72 hours. No results for input(s): NA, K, CL, CO2, BUN, CREATININE, GLUCOSE in the last 72 hours. Invalid input(s):  CA,  PHOS       No results for input(s): MG in the last 72 hours. No results for input(s): TROPONINI in the last 72 hours. No results for input(s): INR in the last 72 hours. LUE venous duplex: 10/17/22  Tech Comments   Right   No evidence of deep vein thrombosis of the right internal jugular vein and   subclavian vein. Left   Technically difficult and limited study due to patient's inability to position   for study. Within the limit of this exam, there is no evidence of deep vein thrombosis or   superficial vein thrombosis of the left upper extremity. There is no previous exam for comparison. Assess/Plan:     Ms. Hieu Villarreal is a very pleasant 47 y.o. female known to our service, CABG x 3 and ANATOLIY clip with Dr. Ronan Mart on 10/10. She presents today with some numbness/tingling and soreness to her LUE after falling asleep with her arm above her head last night. Sensation intact with good pulse and cap refill. No focal weakness. Suspect this is some nerve irritation s/p sternotomy, possibly aggravated her her sleeping position last night. There is no sternal instability. Her incision is healing well. Explained to patient that this may take several weeks to improve/resolve. She is otherwise progressing well. She has been compliant with sternal precautions and medications. She has been gradually increasing her activity at home. She states she went for a short walk outside yesterday.  Reports some fatigue after this but I reassured her that this is normal and it will take some time for her endurance to improve. OK for discharge home from CTS standpoint. Discussed with Aliyah Conklin NP.      F/u appointment on 10/25 with ANJALI Allred.   ______________________________________________________________  ANDREWS Hess - CNP  10/17/2022  12:58 PM

## 2022-10-19 NOTE — ED PROVIDER NOTES
[address] [facility] Emergency Department    CHIEF COMPLAINT  Numbness (Pt woke up with left arm above head and left arm from elbow region to hand with numbness, tingling and burning)      HISTORY OF PRESENT ILLNESS  Arti Cervantes is a 47 y.o. female who presents to the ED complaining of left arm numbness, tingling, burning, discomfort. Patient reports she woke up this morning with her left arm above her head. She has been sleeping in a recliner chair since her CABG 4 days ago. Patient reports that since she woke up this morning she has been experiencing some discomfort to the inside of her left arm, forearm and hand. Intermittent numbness, tingling, burning. Patient tried to call her cardiothoracic surgeon who told her to come into the emergency department for further evaluation. Patient otherwise denies chest pain, shortness of breath, hemoptysis, fever, headache, leg swelling. No other complaints, modifying factors or associated symptoms. Nursing notes reviewed. Past Medical History:   Diagnosis Date    Anemia     Diabetes mellitus ([address]     Hypertension     Thyroid disease     PARTIAL REMOVAL FOR NODULES     Past Surgical History:   Procedure Laterality Date     SECTION      X2    CHOLECYSTECTOMY      CORONARY ARTERY BYPASS GRAFT N/A 10/10/2022    CORONARY ARTERY BYPASS GRAFT TIMES THREE WITH POSSIBLE LEFT ATRIAL APPENDAGE CLIP PLACEMENT, JOHN, TEMPORARY PACING WIRES,  INTERNAL MAMMARY ARTERY AND SAPHNEOUS VEIN GRAFT, EDARTERECTOMY, BILATERAL PECTORALIS NERVE BLOCK FOR PAIN CONTROL performed by Jenny Zavaleta MD at [address] ([address] [place] [address])  2016    total vaginal    THYROIDECTOMY Bilateral 2013    Subtotal thyroidectomy, right thyroid lobe, partial left thyroid lobe     Family History   Problem Relation Age of Onset    Diabetes Father     Cancer Paternal Grandmother         OVARION?      Social History     Socioeconomic History Take 1 tablet by mouth daily 30 tablet 0    insulin aspart (NOVOLOG) 100 UNIT/ML injection pen Inject 4 Units into the skin 3 times daily (before meals) 5 Adjustable Dose Pre-filled Pen Syringe 0    insulin glargine (LANTUS;BASAGLAR) 100 UNIT/ML injection pen Inject 12 Units into the skin nightly 5 Adjustable Dose Pre-filled Pen Syringe 0     No Known Allergies    REVIEW OF SYSTEMS  10 systems reviewed, pertinent positives per HPI otherwise noted to be negative    PHYSICAL EXAM  BP (!) 127/93   Pulse 93   Temp 98.2 °F (36.8 °C) (Oral)   Resp 16   Ht 5' 4\" (1.626 m)   Wt 255 lb (115.7 kg)   LMP 12/13/2016   SpO2 97%   BMI 43.77 kg/m²   GENERAL APPEARANCE: Awake and alert. Cooperative. No acute distress. Vital signs are stable. Well appearing and non toxic. HEAD: Normocephalic. Atraumatic. EYES: PERRL. EOM's grossly intact. ENT: Mucous membranes are moist.   NECK: Supple. Normal ROM. HEART: RRR. Distal pulses are equal and intact. Cap refill less than 2 seconds. LUNGS: Respirations unlabored. CTAB. Good air exchange. Speaking comfortably in full sentences. No wheezing, rhonchi, rales, stridor. CHEST: Surgical incision intact. No dehiscence. No erythema, discharge or drainage. No red streaking. No edema. ABDOMEN: Soft. Non-distended. Non-tender. No guarding or rebound. No rigidity. EXTREMITIES: No peripheral edema. Moves all extremities equally. All extremities neurovascularly intact. Left upper extremity there is some old healing bruising but otherwise no pitting edema, erythema, mass or nodule, abscess. No weakness. Patient able to perform active and passive range of motion. Patient able to perform finger to thumb opposition. Normal strength. Sensation intact. Cap refill less than 2 seconds. Distal pulse intact. Neurovascularly intact. SKIN: Warm and dry. No acute rashes. NEUROLOGICAL: Alert and oriented. No gross facial drooping. Strength 5/5, sensation intact.  Speech is clear.   PSYCHIATRIC: Normal mood and affect. SCREENINGS       RADIOLOGY      VL Extremity Venous Left    Result Date: 10/17/2022  Upper Extremities Veins  Demographics   Patient Name        Amada CHURCHILL   Date of Study       10/17/2022  Gender                 Female   Patient Number      [id number]  Date of Birth          1970   Visit Number        [id number]   Age                    47 year(s)   Accession Number    [id number]  Room Number            27   Corporate ID        [id number]     [address], [place]   Ordering Physician              Interpreting Physician [facility]                                                         Readers                                                         Mary Ulrich MD  Procedure Type of Study:   Veins:Upper Extremities Veins, VL EXTREMITY VENOUS DUPLEX LEFT. Vascular Sonographer Report  Additional Indications:Patient presents to ED with complaints of left arm numbness and tingling after falling asleep with arm above head. Patient is S/P CABG x 1 week ago and was worried about having arm above her head for so long. Denies swelling or pain. Venous Duplex Scan: B-mode imaging of the deep and superficial veins, with compression maneuvers, including color and Doppler spectral analysis. Impressions Right Impression No evidence of deep vein thrombosis of the right internal jugular vein and subclavian vein. Left Impression Technically difficult and limited study due to patient's inability to position for study. Within the limit of this exam, there is no evidence of deep vein thrombosis or superficial vein thrombosis of the left upper extremity. There is no previous exam for comparison. Conclusions   Summary   No evidence of deep vein or superficial venous thrombosis of the left upper  extremity.    Signature   ------------------------------------------------------------------ Electronically signed by Bertha Pino MD (Interpreting  physician) on 10/17/2022 at 02:32 PM  ------------------------------------------------------------------  Patient Status:ER. Study Ayad Luong 47 - Vascular Lab. Technical Quality:Limited visualization due to patient inability to position for study. Velocities are measured in cm/s ; Diameters are measured in mm Right UE Vein Measurements 2D Measurements +--------+----------+---------------+----------+ ! Location! Visualized! Compressibility! Thrombosis! +--------+----------+---------------+----------+ ! IJV     ! Yes       ! Yes            ! None      ! +--------+----------+---------------+----------+ ! SCV     ! Yes       ! Yes            ! None      ! +--------+----------+---------------+----------+ Doppler Measurements +--------+------+------+ ! Location! Signal!Reflux! +--------+------+------+ ! IJV     ! Phasic! No    ! +--------+------+------+ ! SCV     ! Phasic! No    ! +--------+------+------+ Left UE Vein Measurements 2D Measurements +--------+----------+---------------+----------+ ! Location! Visualized! Compressibility! Thrombosis! +--------+----------+---------------+----------+ ! IJV     ! Yes       ! Yes            ! None      ! +--------+----------+---------------+----------+ ! SCV     ! Yes       ! Yes            ! None      ! +--------+----------+---------------+----------+ ! Axillary! Yes       ! Yes            ! None      ! +--------+----------+---------------+----------+ ! Brachial!Yes       ! Yes            ! None      ! +--------+----------+---------------+----------+ ! Radial  !Yes       ! Yes            ! None      ! +--------+----------+---------------+----------+ ! Ulnar   ! Yes       ! Yes            ! None      ! +--------+----------+---------------+----------+ ! Basilic ! Partial   !Yes            ! None      ! +--------+----------+---------------+----------+ ! Cephalic! Yes       ! Yes            ! None      ! +--------+----------+---------------+----------+ Doppler Measurements +--------+------+------+ ! Location! Signal!Reflux! +--------+------+------+ ! IJV     ! Phasic! No    ! +--------+------+------+ ! SCV     ! Phasic! No    ! +--------+------+------+ ! Axillary! Phasic! No    ! +--------+------+------+ ! Brachial!Phasic! No    ! +--------+------+------+            CONSULTS  IP CONSULT TO [address] [place] [address]    ED COURSE/MDM  Patient seen and evaluated. Old records reviewed. Diagnostic testing reviewed and results discussed. I have independently evaluated this patient based upon my scope of practice. Supervising physician was in the department for consultation as needed. Ayad Lemus presented to the ED today with above noted complaints. Venous Doppler of the left upper extremity negative for DVT. Cardiothoracic surgery consulted and nurse practitioner Pastora Madden at bedside to evaluate patient. Reassured patient that this is most likely due to nerves that may have been severed during her surgery. Patient discharged home in stable condition. At this point I do not feel the patient requires further work up and it is reasonable to discharge the patient. A discussion was had with the patient and/or their surrogate regarding diagnosis, diagnostic testing results, treatment/ plan of care, and follow up. There was shared decision-making between myself as well as the patient and/or their surrogate and we are all in agreement with discharge home. There was an opportunity for questions and all questions were answered to the best of my ability and to the satisfaction of the patient and/or patient family. Patient will follow up with CT surgery for further evaluation/treatment. The patient was given strict return precautions as we discussed symptoms that would necessitate return to the ED. Patient will return to ED for new/worsening symptoms. The patient verbalized their understanding and agreement with the above plan.     Please refer to AVS for further details regarding discharge instructions. No results found for this visit on 10/17/22. I estimate there is LOW risk for COMPARTMENT SYNDROME, DEEP VENOUS THROMBOSIS, SEPTIC ARTHRITIS, TENDON OR NEUROVASCULAR INJURY, thus I consider the discharge disposition reasonable. Joelle Ramos and I have discussed the diagnosis and risks, and we agree with discharging home to follow-up with their primary doctor or the referral orthopedist. We also discussed returning to the Emergency Department immediately if new or worsening symptoms occur. We have discussed the symptoms which are most concerning (e.g., changing or worsening pain, numbness, weakness) that necessitate immediate return. Final Impression    1. Paresthesia        Blood pressure (!) 127/93, pulse 93, temperature 98.2 °F (36.8 °C), temperature source Oral, resp. rate 16, height 5' 4\" (1.626 m), weight 255 lb (115.7 kg), last menstrual period 12/13/2016, SpO2 97 %, not currently breastfeeding.mdm    Patient was sent home with a prescription for below medication/s. I did [place] patient on appropriate use of these medication. Discharge Medication List as of 10/17/2022  1:17 PM              FOLLOW UP  Chase Kline MD  [address]  ED  [address] [facility] [address]  [address] [place] [address]  [phone]        DISPOSITION  Patient was discharged to home in good condition. Comment: Please note this report has been produced using speech recognition software and may contain errors related to that system including errors in grammar, punctuation, and spelling, as well as words and phrases that may be inappropriate. If there are any questions or concerns please feel free to contact the dictating provider for clarification.            ANDREWS Santo - CNP  10/19/22 8820

## 2022-10-20 DIAGNOSIS — Z95.1 S/P CABG (CORONARY ARTERY BYPASS GRAFT): ICD-10-CM

## 2022-10-20 DIAGNOSIS — I25.110 CORONARY ARTERY DISEASE INVOLVING NATIVE CORONARY ARTERY OF NATIVE HEART WITH UNSTABLE ANGINA PECTORIS (HCC): Primary | ICD-10-CM

## 2022-10-20 RX ORDER — FUROSEMIDE 40 MG/1
40 TABLET ORAL DAILY
Qty: 7 TABLET | Refills: 0 | Status: SHIPPED | OUTPATIENT
Start: 2022-10-20 | End: 2022-11-01 | Stop reason: ALTCHOICE

## 2022-10-20 RX ORDER — POTASSIUM CHLORIDE 750 MG/1
10 TABLET, EXTENDED RELEASE ORAL DAILY
Qty: 7 TABLET | Refills: 0 | Status: SHIPPED | OUTPATIENT
Start: 2022-10-20 | End: 2022-11-01 | Stop reason: ALTCHOICE

## 2022-10-20 RX ORDER — LANOLIN ALCOHOL/MO/W.PET/CERES
400 CREAM (GRAM) TOPICAL DAILY
Qty: 7 TABLET | Refills: 0 | Status: SHIPPED | OUTPATIENT
Start: 2022-10-20 | End: 2022-11-01 | Stop reason: ALTCHOICE

## 2022-10-25 ENCOUNTER — OFFICE VISIT (OUTPATIENT)
Dept: CARDIOTHORACIC SURGERY | Age: 52
End: 2022-10-25

## 2022-10-25 VITALS
SYSTOLIC BLOOD PRESSURE: 130 MMHG | OXYGEN SATURATION: 97 % | HEIGHT: 64 IN | WEIGHT: 251.6 LBS | TEMPERATURE: 97 F | HEART RATE: 96 BPM | DIASTOLIC BLOOD PRESSURE: 77 MMHG | BODY MASS INDEX: 42.95 KG/M2

## 2022-10-25 DIAGNOSIS — Z95.1 S/P CABG (CORONARY ARTERY BYPASS GRAFT): Primary | ICD-10-CM

## 2022-10-25 PROCEDURE — 99024 POSTOP FOLLOW-UP VISIT: CPT

## 2022-10-25 NOTE — PROGRESS NOTES
Cardiac, Vascular and Thoracic Surgeons  Clinic Note     10/25/2022 10:06 AM  Surgeon:  Antwon Chambers     S/P :  1st post-op s/p CABG x3, JOHN, edarterectomy, ANATOLIY clip, & bilateral PB on 10/10/22 w/ MOM. Chief complaint : 1st post-op  Subjective:  Ms. Jaya Fox is doing well post-op. She has had a couple episodes of bradycardia, once after a hot shower when she was bending over to dry herself. She called on call surgeon, but HR improved and returned to 80s. She denies pain today. She only has SOBOE on walks. No swelling to extremities, she will finish up qd lasix in a few days. Suture removed from previous CT site x 1. MSI has a small spot in the middle that appears to be draining just slightly. Patient has not noticed it at home. SVG and previous chest tube sites both healing nicely, no redness or drainage. Put new parameters in place for metoprolol yesterday (don't take if sys < 110, HR < 65). Vital Signs: /77 (Site: Right Upper Arm, Position: Sitting, Cuff Size: Medium Adult) Comment: took lopressor around 0630 am  Pulse 96   Temp 97 °F (36.1 °C) (Temporal)   Ht 5' 4\" (1.626 m)   Wt 251 lb 9.6 oz (114.1 kg)   LMP 12/13/2016   SpO2 97%   BMI 43.19 kg/m²      I/O:  No intake or output data in the 24 hours ending 10/25/22 1006    Exam:   Cardiovascular: Clear S1, S2. No MRG. Pulmonary: CTAB    Lower extremity: no peripheral edema  Incision: MSI CDI. CT sites CDI. RLE harvest site CDI. Labs:   CBC: No results for input(s): WBC, HGB, HCT, MCV, PLT in the last 72 hours. BMP: No results for input(s): NA, K, CL, CO2, PHOS, BUN, CREATININE, CA in the last 72 hours. PT/INR: No results for input(s): PROTIME, INR in the last 72 hours. APTT: No results for input(s): APTT in the last 72 hours.     Scheduled Meds:     Patient Active Problem List   Diagnosis    Coronary artery disease involving native coronary artery of native heart with unstable angina pectoris (HCC)    Ischemic cardiomyopathy    CAD,

## 2022-10-26 DIAGNOSIS — Z95.1 S/P CABG (CORONARY ARTERY BYPASS GRAFT): Primary | ICD-10-CM

## 2022-10-26 RX ORDER — CARVEDILOL 6.25 MG/1
TABLET ORAL
Qty: 60 TABLET | Refills: 1 | Status: SHIPPED | OUTPATIENT
Start: 2022-10-26

## 2022-10-26 NOTE — PROGRESS NOTES
Patient called today to report that she had an episode of fluttering and increased HR last night. It woke her up out of her sleep. She checked her pulse and it was ~ 250-260 bpm for 5 minutes. HR then returned to normal. Patient called surgeon on call and they asked patient to stop lasix/mag/K+ immediately. They asked her to return parameters of lopressor to hold if SBP < 100 (from < 110), HR < 65 bpm.    Reviewed w/ Lazaro Arvizu., CVTS PA. Changed patient's lopressor to coreg. Ordered coreg 6.25 mg BID to patient's preferred pharmacy. Patient agreeable to try new med and stop lopressor. Same parameters in place. Patient verbalized understanding. She will call with an update after a few doses of coreg, or sooner with any concerns.

## 2022-10-28 DIAGNOSIS — Z95.1 S/P CABG (CORONARY ARTERY BYPASS GRAFT): Primary | ICD-10-CM

## 2022-10-28 RX ORDER — CLINDAMYCIN HYDROCHLORIDE 300 MG/1
300 CAPSULE ORAL 2 TIMES DAILY
Qty: 14 CAPSULE | Refills: 0 | Status: SHIPPED | OUTPATIENT
Start: 2022-10-28 | End: 2022-11-01 | Stop reason: ALTCHOICE

## 2022-10-28 NOTE — PROGRESS NOTES
Received a call from Royal Thompson. Patient is continuing to have drainage from the medial portion of her MSI. The Royal Thompson therapist states that is appears she has pus, but the drainage actually comes out clear. She does report that it is starting to look [place]. \" They are supposed to send images to my secure [facility] e-mail account. Will attach images to note below. Reviewed with Sukhjinder Abdullahi, CVTS PA. She would like patient to receive script for clindamycin 300 mg PO BID x 7 days. Order sent to patient's preferred pharmacy.

## 2022-10-31 ENCOUNTER — TELEPHONE (OUTPATIENT)
Dept: CARDIOTHORACIC SURGERY | Age: 52
End: 2022-10-31

## 2022-10-31 NOTE — TELEPHONE ENCOUNTER
Patient called to give an update on her MSI. She was started on Clindamycin on 10/28 and has been taking as directed. She reports that over the weekend the incision was red with pinkish/clear and yellow drainage. Drainage did not saturate dressing. She denies fever, warmth, or tenderness and states that the redness is improving. Appointment made with Dr. Anthony Avina tomorrow for evaluation of wound. She is aware and agreeable with plan.

## 2022-11-01 ENCOUNTER — OFFICE VISIT (OUTPATIENT)
Dept: CARDIOTHORACIC SURGERY | Age: 52
End: 2022-11-01

## 2022-11-01 VITALS
TEMPERATURE: 97.2 F | SYSTOLIC BLOOD PRESSURE: 105 MMHG | OXYGEN SATURATION: 97 % | HEIGHT: 64 IN | HEART RATE: 84 BPM | BODY MASS INDEX: 43.54 KG/M2 | WEIGHT: 255 LBS | DIASTOLIC BLOOD PRESSURE: 63 MMHG

## 2022-11-01 DIAGNOSIS — Z95.1 S/P CABG (CORONARY ARTERY BYPASS GRAFT): Primary | ICD-10-CM

## 2022-11-01 PROCEDURE — 99024 POSTOP FOLLOW-UP VISIT: CPT

## 2022-11-01 RX ORDER — AMOXICILLIN AND CLAVULANATE POTASSIUM 875; 125 MG/1; MG/1
1 TABLET, FILM COATED ORAL 2 TIMES DAILY
Qty: 14 TABLET | Refills: 0 | Status: SHIPPED | OUTPATIENT
Start: 2022-11-01 | End: 2022-11-08 | Stop reason: ALTCHOICE

## 2022-11-01 NOTE — PROGRESS NOTES
Cardiac, Vascular and Thoracic Surgeons  Clinic Note     11/1/2022 9:50 AM  Surgeon:  Raul Sotelo     S/P :  CABG x 3, JOHN, Endarterectomy, ANATOLIY clip, bilateral PB on 10/10/22 w/ MOM. Chief complaint : Wound check, 2nd post op follow up   Subjective:  Ms. Matt Rose is dong good. No SOB does have some right leg swelling. Pain is controlled. MSI with a small amount of erythema at the medial aspect. Some yellow drainage noted in the medial portion of the MSI. Denies fevers or lethargy. CT and SVG sites with out erythema or purulence. Cardiology on 11/17 for follow up. Vital Signs: /63 (Site: Left Upper Arm, Position: Sitting)   Pulse 84   Temp 97.2 °F (36.2 °C) (Infrared)   Ht 5' 4\" (1.626 m)   Wt 255 lb (115.7 kg)   LMP 12/13/2016   SpO2 97%   BMI 43.77 kg/m²      I/O:  No intake or output data in the 24 hours ending 11/01/22 0950    Exam:   Cardiovascular: Clear S1, S2. No MRG. Pulmonary: CTAB    Lower extremity: 1+ peripheral edema  Incision: MSI with two small areas of white/yellow tissue. Without erythema, or purulence. CT sites CDI. RLE harvest site CDI. Labs:   CBC: No results for input(s): WBC, HGB, HCT, MCV, PLT in the last 72 hours. BMP: No results for input(s): NA, K, CL, CO2, PHOS, BUN, CREATININE, CA in the last 72 hours. PT/INR: No results for input(s): PROTIME, INR in the last 72 hours. APTT: No results for input(s): APTT in the last 72 hours. Scheduled Meds:     Patient Active Problem List   Diagnosis    Coronary artery disease involving native coronary artery of native heart with unstable angina pectoris (HCC)    Ischemic cardiomyopathy    CAD, multiple vessel       Assessment/Plan:   S/p CABG x 3 with MOM  - Patient presents for assessment of sternal incision s/p CABG 10/10. Denies fever, chills, malaise. - Incision is not erythematous. Two small areas of white/yellow tissue along MSI.  No purulence, although patient states she had drainage over weekend but it has improved.    - Switched patient from clindamycin to Augmentin x 7 days  - F/u in 1 week    Bartholome Better, QUINTON

## 2022-11-04 ENCOUNTER — TELEPHONE (OUTPATIENT)
Dept: CARDIOTHORACIC SURGERY | Age: 52
End: 2022-11-04

## 2022-11-04 NOTE — TELEPHONE ENCOUNTER
Spoke with Mrs. Medrano this morning about her MSI. She sent images to my secure [facility] e-mail account late yesterday afternoon. Will attach images to note below. Reviewed images with LALA Rodriguez. No changes to current treatment plan at this time. Patient taking augmentin as prescribed. Patient to keep a close eye on incision and take daily pictures to review together at office visit Tuesday 11/8. She will continue to cleanse with antibacterial soap & water. Educated on s/s of progressing infection for patient to monitor over the weekend. She will call with any changes, questions, or concerns. No

## 2022-11-08 ENCOUNTER — OFFICE VISIT (OUTPATIENT)
Dept: CARDIOTHORACIC SURGERY | Age: 52
End: 2022-11-08

## 2022-11-08 VITALS
TEMPERATURE: 97.2 F | WEIGHT: 256.2 LBS | OXYGEN SATURATION: 98 % | SYSTOLIC BLOOD PRESSURE: 110 MMHG | HEIGHT: 64 IN | HEART RATE: 79 BPM | DIASTOLIC BLOOD PRESSURE: 80 MMHG | BODY MASS INDEX: 43.74 KG/M2

## 2022-11-08 DIAGNOSIS — Z95.1 S/P CABG (CORONARY ARTERY BYPASS GRAFT): Primary | ICD-10-CM

## 2022-11-08 PROCEDURE — 99024 POSTOP FOLLOW-UP VISIT: CPT

## 2022-11-08 RX ORDER — CLOPIDOGREL BISULFATE 75 MG/1
75 TABLET ORAL DAILY
Qty: 30 TABLET | Refills: 0 | Status: SHIPPED | OUTPATIENT
Start: 2022-11-08 | End: 2022-12-08

## 2022-11-08 RX ORDER — ASPIRIN 81 MG/1
81 TABLET ORAL DAILY
Qty: 30 TABLET | Refills: 0 | Status: SHIPPED | OUTPATIENT
Start: 2022-11-08 | End: 2022-12-08

## 2022-11-08 RX ORDER — AMOXICILLIN AND CLAVULANATE POTASSIUM 875; 125 MG/1; MG/1
1 TABLET, FILM COATED ORAL 2 TIMES DAILY
Qty: 14 TABLET | Refills: 0 | Status: SHIPPED | OUTPATIENT
Start: 2022-11-08 | End: 2022-11-15

## 2022-11-08 RX ORDER — ATORVASTATIN CALCIUM 40 MG/1
40 TABLET, FILM COATED ORAL NIGHTLY
Qty: 30 TABLET | Refills: 0 | Status: SHIPPED | OUTPATIENT
Start: 2022-11-08 | End: 2022-12-08

## 2022-11-08 NOTE — PROGRESS NOTES
[facility]  Clinic Note     11/8/2022 10:29 AM  Surgeon:  Marcy Vera     S/P :  *MSI wound check, started on Clindamycin 10/28* 2nd post-op s/p CABG x3, JOHN, edarterectomy, ANATOLIY clip, & bilateral PB on 10/10/22 w/ MOM. Chief complaint : wound check  Subjective:  Ms. Carcamo  is doing well post-op. She still has concerns about her MSI. She has noticed some tan drainage. She is very diligent about cleansing the area. She finished her augmentin this morning. She is not having very much pain at all. She is walking around a lot, at home & at the store. She is having some tenderness just above the inner knee r/t SVG harvest site. She does have some continued drainage & redness to distal MSI. Drainage has never had an odor since initial encounter. Vital Signs: /80 (Site: Right Upper Arm, Position: Sitting, Cuff Size: Medium Adult)   Pulse 79   Temp 97.2 °F (36.2 °C) (Temporal)   Ht 5' 4\" (1.626 m)   Wt 256 lb 3.2 oz (116.2 kg)   LMP 12/13/2016   SpO2 98%   BMI 43.98 kg/m²      I/O:  No intake or output data in the 24 hours ending 11/08/22 1029    Exam:   Cardiovascular: Clear S1, S2. No MRG. Pulmonary: CTAB    Lower extremity: no peripheral edema  Incision: MSI healing well. Inferior portion with area of drainage, improved from last week. Slight erythema. CT sites CDI. RLE harvest site CDI. Labs:   CBC: No results for input(s): WBC, HGB, HCT, MCV, PLT in the last 72 hours. BMP: No results for input(s): NA, K, CL, CO2, PHOS, BUN, CREATININE, CA in the last 72 hours. PT/INR: No results for input(s): PROTIME, INR in the last 72 hours. APTT: No results for input(s): APTT in the last 72 hours.     Scheduled Meds:     Patient Active Problem List   Diagnosis    Coronary artery disease involving native coronary artery of native heart with unstable angina pectoris (HCC)    Ischemic cardiomyopathy    CAD, multiple vessel       Assessment/Plan:   MSI wound check  - Improved from last week. Still with slight erythema and drainage inferiorly. - Cont Augmentin x 7 days  - Pt very diligent about cleaning site, will cont doing so.   - F/u with me in 1 week    IVAN CartwrightC

## 2022-11-08 NOTE — LETTER
[facility]  [address] [address]  Phone: [phone]  Fax: [phone]    Birgit Ramirez        November 8, 2022     Patient: Evans Haywood   YOB: 1970   Date of Visit: 11/8/2022       To Whom It May Concern:    Please excuse Rah Massey from work today as he was assisting his wife in getting to her appointment. If you have any questions or concerns, please don't hesitate to call.     Sincerely,          ANJALI Ramirez

## 2022-11-15 ENCOUNTER — OFFICE VISIT (OUTPATIENT)
Dept: CARDIOTHORACIC SURGERY | Age: 52
End: 2022-11-15

## 2022-11-15 VITALS
BODY MASS INDEX: 43.91 KG/M2 | OXYGEN SATURATION: 97 % | HEIGHT: 64 IN | TEMPERATURE: 97.2 F | SYSTOLIC BLOOD PRESSURE: 110 MMHG | WEIGHT: 257.2 LBS | DIASTOLIC BLOOD PRESSURE: 77 MMHG | HEART RATE: 72 BPM

## 2022-11-15 DIAGNOSIS — Z95.1 S/P CABG (CORONARY ARTERY BYPASS GRAFT): Primary | ICD-10-CM

## 2022-11-15 PROCEDURE — 99024 POSTOP FOLLOW-UP VISIT: CPT

## 2022-11-15 NOTE — PROGRESS NOTES
[facility]  Clinic Note     11/15/2022 1:48 PM  Surgeon:  Deon Castillo     S/P : 3rd post-op s/p CABG x3, JOHN, edarterectomy, ANATOLIY clip, & bilateral PB on 10/10/22 w/ MOM. Chief complaint : wound check  Subjective:  Ms. Michelle Burns is doing okay. States her incision is draining about the same amount as prior. Denies surgical pain, CP, SOB, fever, chills. Has been changing her dressing frequently, in clean fashion. Vital Signs: /77 (Site: Left Upper Arm, Position: Sitting, Cuff Size: Medium Adult)   Pulse 72   Temp 97.2 °F (36.2 °C) (Temporal)   Ht 5' 4\" (1.626 m)   Wt 257 lb 3.2 oz (116.7 kg)   LMP 12/13/2016   SpO2 97%   BMI 44.15 kg/m²      I/O:  No intake or output data in the 24 hours ending 11/15/22 1348    Exam:   Cardiovascular: Clear S1, S2. No MRG. Pulmonary: CTAB    Lower extremity: no peripheral edema  Incision: Inferior portion of MSI erythematous, with scant amount of drainage. CT sites CDI. RLE harvest site CDI. Labs:   CBC: No results for input(s): WBC, HGB, HCT, MCV, PLT in the last 72 hours. BMP: No results for input(s): NA, K, CL, CO2, PHOS, BUN, CREATININE, CA in the last 72 hours. PT/INR: No results for input(s): PROTIME, INR in the last 72 hours. APTT: No results for input(s): APTT in the last 72 hours. Scheduled Meds:     Patient Active Problem List   Diagnosis    Coronary artery disease involving native coronary artery of native heart with unstable angina pectoris (HCC)    Ischemic cardiomyopathy    CAD, multiple vessel       Assessment/Plan:    3rd post-op s/p CABG x3, JOHN, edarterectomy, ANATOLIY clip, & bilateral PB on 10/10/22 w/ MOM. - Pt presents for wound check.  Inferior portion still healing.   - Prevena placed, will f/u in 1 week for further wound evaluation  - Will hold off on abx at this time    Alferd Crigler, PA-C

## 2022-11-17 ENCOUNTER — OFFICE VISIT (OUTPATIENT)
Dept: CARDIOLOGY CLINIC | Age: 52
End: 2022-11-17
Payer: COMMERCIAL

## 2022-11-17 VITALS
HEART RATE: 76 BPM | SYSTOLIC BLOOD PRESSURE: 118 MMHG | OXYGEN SATURATION: 98 % | HEIGHT: 64 IN | BODY MASS INDEX: 43.5 KG/M2 | TEMPERATURE: 98.7 F | DIASTOLIC BLOOD PRESSURE: 68 MMHG | WEIGHT: 254.8 LBS

## 2022-11-17 DIAGNOSIS — I25.5 ISCHEMIC CARDIOMYOPATHY: Primary | ICD-10-CM

## 2022-11-17 DIAGNOSIS — I25.10 CORONARY ARTERY DISEASE INVOLVING NATIVE CORONARY ARTERY OF NATIVE HEART WITHOUT ANGINA PECTORIS: ICD-10-CM

## 2022-11-17 PROCEDURE — 99215 OFFICE O/P EST HI 40 MIN: CPT | Performed by: NURSE PRACTITIONER

## 2022-11-17 RX ORDER — LISINOPRIL 2.5 MG/1
2.5 TABLET ORAL DAILY
Qty: 30 TABLET | Refills: 3 | Status: ON HOLD | OUTPATIENT
Start: 2022-11-17 | End: 2022-11-24 | Stop reason: HOSPADM

## 2022-11-17 NOTE — PATIENT INSTRUCTIONS
Everything looks great today, good job!   Start lisinopril 2.5 mg nightly for heart strength  Blood work 1-2 weeks after  Continue other medications  Follow up in 3-4 weeks

## 2022-11-17 NOTE — PROGRESS NOTES
[facility]   Cardiology Note              Date:  2022  Patientname: Traci Shea  YOB: 1970    Primary Care physician: Nato Ty DO    HISTORY OF PRESENT ILLNESS: Traci Shea is a 47 y.o. female with a history of CAD, ischemic cardiomyopathy, HTN, HLD, DM. She presented 10/2022 for chest pain and found to have NSTEMI. LHC showed severe CAD and referred for CABG. Echo showed EF 35-40%. On 10/10/2022 she had CABG x3, ANATOLIY clip. Today she presents for hospital follow up for NSTEMI/CAD s/p CABG. Overall she feels well but is having issues with sternal site healing, following with CT surgery and has a wound vac. Initially after discharge she had heart rate issues but is now improved on carvedilol. She has no chest pain, edema, dizziness, palpitations. She has no shortness of breath with exertion but does feel mildly short of breath when laying down. She has made healthy diet changes. She is walking at home and tolerating well. Home BP and HR controlled. 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    [employer] today 2022: 254 lbs  Hospital weight 10/13/2022: 264 lbs    Cardiologist: Dr. Jazzmine Jordan 10/2022 while hospitalized    Past Medical History:   has a past medical history of Anemia, Diabetes mellitus ([address] Hypertension, and Thyroid disease. Past Surgical History:   has a past surgical history that includes Cholecystectomy;  section; Thyroidectomy (Bilateral, 2013); Hysterectomy (2016); and Coronary artery bypass graft (N/A, 10/10/2022). Home Medications:    Prior to Admission medications    Medication Sig Start Date End Date Taking?  Authorizing Provider   aspirin 81 MG EC tablet Take 1 tablet by mouth daily 22  ANJALI Allred   atorvastatin (LIPITOR) 40 MG tablet Take 1 tablet by mouth nightly 22  ANJALI Ambriz   clopidogrel (PLAVIX) 75 MG tablet Take 1 tablet by mouth daily 11/8/22 12/8/22  ANJALI Ramirez   carvedilol (COREG) 6.25 MG tablet Take 1 tablet by mouth 2 times daily Hold for SBP <100 or HR <65 10/26/22   ANJALI Ambriz   famotidine (PEPCID) 20 MG tablet Take 1 tablet by mouth daily 10/14/22 11/13/22  ANDREWS Hale CNP   insulin aspart (NOVOLOG) 100 UNIT/ML injection pen Inject 4 Units into the skin 3 times daily (before meals) 10/14/22 11/13/22  ANDREWS Hale CNP   insulin glargine (LANTUS;BASAGLAR) 100 UNIT/ML injection pen Inject 12 Units into the skin nightly 10/14/22 11/13/22  ANDREWS Hale CNP     Allergies:  Patient has no known allergies. Social History:   reports that she has never smoked. She has never used smokeless tobacco. She reports that she does not drink alcohol and does not use drugs. Family History: family history includes Cancer in her paternal grandmother; Diabetes in her father. Review of Systems   Review of Systems   Constitutional: Negative. Respiratory:  Positive for shortness of breath. Cardiovascular: Negative. Neurological: Negative.       OBJECTIVE:    Vital signs:    /68   Pulse 76   Temp 98.7 °F (37.1 °C)   Ht 5' 4\" (1.626 m)   Wt 254 lb 12.8 oz (115.6 kg)   LMP 12/13/2016   SpO2 98%   BMI 43.74 kg/m²      Physical Exam:  Constitutional:  Comfortable and alert, NAD, appears stated age  Eyes: PERRL, sclera nonicteric  Neck:  Supple, no masses, no thyroidmegaly, no JVD  Skin:  Warm and dry; no rash or lesions; +sternal incision and wound vac  Heart:  Regular, normal apex, S1 and S2 normal, no M/G/R  Lungs:  Normal respiratory effort; clear; no wheezing/rhonchi/rales  Abdomen: soft, non tender, + bowel sounds  Extremities:  No edema or cyanosis; no clubbing  Neuro: alert and oriented, moves legs and arms equally, normal mood and affect    Data Reviewed:      CABG 10/10/2022:  CORONARY ARTERY BYPASS GRAFT TIMES THREE WITH POSSIBLE LEFT 2:30pm to 3pm with versed 3mg/fentanyl 50mcg during the procedure. An independent trained observer pushed meds at my direction. We monitored the patient's level of consciousness and vital signs/physiologic status throughout the procedure duration (see times listed previously). 120cc contrast was utilized. <20cc EBL. FINDINGS    LVGRAM  LVEDP 13   GRADIENT ACROSS AORTIC VALVE None   LV FUNCTION EF 30%   WALL MOTION Anteroapical HK   MITRAL REGURGITATION Mild    LEO is widely patent  CORONARY ARTERIES  LM Less than 10% ejukjosq-nbu-upxfiw stenosis            LAD Lufm-ftk-dhufvq serial 99% stenoses. LCX Prox-mid 40% stenosis. Distal 95% stenosis. RCA Dominant, Prox-mid 40% stenosis. Distal 50% stenosis. PDA has prox 40% stenosis. PLV has prox-mid 80-95% stenosis. There are R-L collaterals     CONCLUSIONS:    MV CAD/ASHD  Refer to CT surgery for consideration of CABG  Can consider high risk MV PCI if not felt to be surgically candidate or per pt preference. Place on heparin/Integrilin drips. Cardiology Labs Reviewed:   CBC: No results for input(s): WBC, HGB, HCT, PLT in the last 72 hours. BMP:No results for input(s): NA, K, CO2, BUN, CREATININE, LABGLOM, GLUCOSE in the last 72 hours. PT/INR: No results for input(s): PROTIME, INR in the last 72 hours. APTT:No results for input(s): APTT in the last 72 hours. FASTING LIPID PANEL:  Lab Results   Component Value Date/Time    HDL 38 10/10/2022 03:24 AM    LDLCALC 95 10/10/2022 03:24 AM    TRIG 122 10/10/2022 03:24 AM     LIVER PROFILE:No results for input(s): AST, ALT, ALB in the last 72 hours. BNP: No results found for: PROBNP    Reviewed all labs and imaging today    Assessment:   CAD: no current angina; s/p CABG x3 and ANATOLIY clip in the setting of NSTEMI 10/10/2022  Ischemic cardiomyopathy: EF 35-40% on echo 10/2022  HTN: controlled  HLD: sub optimal, LDL 90, statin and recheck  DM: hgb a1c 14    Plan:   1.  Hold cardiac rehab until sternal incision healed, currently has wound vac and following with CT surgery  2. Start lisinopril 2.5 mg daily for LV dysfunction  3. BMP in 1 weeks  4. Continue aspirin, statin, carvedilol, plavix  5. Repeat lipids in 2 months  6. Will reassess EF in follow up  7. Follow up with me in 3-4 weeks then with Dr. Alban Steele in 2 months at [facility]    More than 45 minutes of time was spent in direct patient contact during this visit, more than 50% of which was spent educating regarding CAD, procedural details and lifestyle modifications.     iNna Cantu, APRN-CNP  [address]  [phone]

## 2022-11-19 ASSESSMENT — ENCOUNTER SYMPTOMS: SHORTNESS OF BREATH: 1

## 2022-11-22 ENCOUNTER — OFFICE VISIT (OUTPATIENT)
Dept: CARDIOTHORACIC SURGERY | Age: 52
End: 2022-11-22

## 2022-11-22 ENCOUNTER — TELEPHONE (OUTPATIENT)
Dept: CARDIOTHORACIC SURGERY | Age: 52
End: 2022-11-22

## 2022-11-22 ENCOUNTER — ANESTHESIA EVENT (OUTPATIENT)
Dept: OPERATING ROOM | Age: 52
DRG: 902 | End: 2022-11-22
Payer: COMMERCIAL

## 2022-11-22 VITALS
OXYGEN SATURATION: 97 % | SYSTOLIC BLOOD PRESSURE: 109 MMHG | DIASTOLIC BLOOD PRESSURE: 78 MMHG | WEIGHT: 253 LBS | HEIGHT: 64 IN | HEART RATE: 80 BPM | TEMPERATURE: 97.2 F | BODY MASS INDEX: 43.19 KG/M2

## 2022-11-22 DIAGNOSIS — Z95.1 S/P CABG (CORONARY ARTERY BYPASS GRAFT): Primary | ICD-10-CM

## 2022-11-22 PROCEDURE — 99024 POSTOP FOLLOW-UP VISIT: CPT

## 2022-11-22 NOTE — PROGRESS NOTES
[facility]  Clinic Note     11/22/2022 11:26 AM  Surgeon:  Carrie Cartwright     S/P :   Lyla Cline in place - wound check* S/p post-op s/p CABG x3, JOHN, edarterectomy, ANATOLIY clip, & bilateral PB on 10/10/22 w/ MOM. Chief complaint : wound check  Subjective:  Ms. Gumaro Power is doing well today. She has had pravena in place since last Tuesday. She has been following a very strict diet. Averaging about 150 BS wise. Vital Signs: /78 (Site: Left Upper Arm, Position: Sitting, Cuff Size: Medium Adult)   Pulse 80   Temp 97.2 °F (36.2 °C) (Temporal)   Ht 5' 4\" (1.626 m)   Wt 253 lb (114.8 kg)   LMP 12/13/2016   SpO2 97%   BMI 43.43 kg/m²      I/O:  No intake or output data in the 24 hours ending 11/22/22 1126    Exam:   Cardiovascular: Clear S1, S2. No MRG. Pulmonary: CTAB    Lower extremity: no peripheral edema  Incision: MSI inferior portion remain open after debridement in office last week, pink, no evidence of purulent drainage. CT sites CDI. RLE harvest site CDI. Labs:   CBC: No results for input(s): WBC, HGB, HCT, MCV, PLT in the last 72 hours. BMP: No results for input(s): NA, K, CL, CO2, PHOS, BUN, CREATININE, CA in the last 72 hours. PT/INR: No results for input(s): PROTIME, INR in the last 72 hours. APTT: No results for input(s): APTT in the last 72 hours. Scheduled Meds:     Patient Active Problem List   Diagnosis    Coronary artery disease involving native coronary artery of native heart with unstable angina pectoris (HCC)    Ischemic cardiomyopathy    CAD, multiple vessel       Assessment/Plan:   S/p post-op s/p CABG x3, JOHN, edarterectomy, ANATOLIY clip, & bilateral PB on 10/10/22 w/ MOM  - Patients wound open inferiorly. Need for wound debridement and wound vac placement tomorrow.    - F/u in 1 week    Rosa Alejo PA-C

## 2022-11-22 NOTE — TELEPHONE ENCOUNTER
Spoke with patient and  in the office regarding out patient surgery scheduled for 11/23/2022 at 7:45 am with arrival time of 5:30 am at [facility] with Dr. Bishop Sheppard to include: sternal debridement with wound vacuum placement. No pre-op labs needed. Patient has been instructed not to eat or drink after midnight.

## 2022-11-22 NOTE — PROGRESS NOTES
Santy Simonton    Age 47 y.o.    female    1970    MRN [id number]    11/23/2022  Arrival Time_____________  OR Time____________[address]     Procedure(s):  STERNAL WOUND DEBRIDEMENT WITH WOUND VACUUM PLACEMENT                      General   Surgeon(s):  Nya Pate, MD      DAY ADMIT ___  SDS/OP ___  OUTPT IN BED ___        Phone [phone] (home)     PCP _____________________ Phone_________________ Epic ( ) Epic CE ( ) Appt ________    ADDITIONAL INFO __________________________________ Cardio/Consult _____________    NOTES _____________________________________________________________________    ____________________________________________________________________________    PAT APPT DATE:________ TIME: ________  FAXED QAD: _______  (__) H&P w/ Hospitalist  __________________________________________________________________________  Preop Nurse phone screen complete: _____________  (__) CBC     (__) W/ DIFF ___________     (__) Hgb A1C    ___________  (__) CHEST X RAY   __________  (__) LIPID PROFILE  ___________  (__) EKG   __________  (__) PT/PTT   ___________  (__) PFT's   __________  (__) BMP   ___________  (__) CAROTIDS  __________  (__) CMP   ___________  (__) VEIN MAPPING  __________  (__) U/A   ___________  (__) HISTORY & PHYSICAL __________  (__) URINE C & S  ___________  (__) CARDIAC CLEARANCE __________  (__) U/A W/ FLEX  ___________  (__) PULM.  CLEARANCE __________  (__) SERUM PREGNANCY ___________  (__) Check Epic DOS orders __________  (__) TYPE & SCREEN __________repeat ( ) (__)  __________________ __________  (__) ALBUMIN   ___________  (__)  __________________ __________  (__) TRANSFERRIN  ___________  (__)  __________________ __________  (__) LIVER PROFILE  ___________  (__)  __________________ __________  (__) MRSA NASAL SWAB ___________  (__) URINE PREG DOS __________  (__) SED RATE  ___________  (__) BLOOD SUGAR DOS __________  (__) C-REACTIVE PROTEIN ___________    (__) VITAMIN D HYDROXY ___________  (__) BLOOD THINNERS __________    (__) ACE/ ARBS: _____________________     (__) BETABLOCKERS __________________

## 2022-11-23 ENCOUNTER — TELEPHONE (OUTPATIENT)
Dept: CARDIOTHORACIC SURGERY | Age: 52
End: 2022-11-23

## 2022-11-23 ENCOUNTER — HOSPITAL ENCOUNTER (INPATIENT)
Age: 52
LOS: 1 days | Discharge: HOME | DRG: 902 | End: 2022-11-23
Attending: STUDENT IN AN ORGANIZED HEALTH CARE EDUCATION/TRAINING PROGRAM | Admitting: STUDENT IN AN ORGANIZED HEALTH CARE EDUCATION/TRAINING PROGRAM
Payer: COMMERCIAL

## 2022-11-23 ENCOUNTER — APPOINTMENT (OUTPATIENT)
Dept: GENERAL RADIOLOGY | Age: 52
DRG: 902 | End: 2022-11-23
Payer: COMMERCIAL

## 2022-11-23 ENCOUNTER — ANESTHESIA (OUTPATIENT)
Dept: OPERATING ROOM | Age: 52
DRG: 902 | End: 2022-11-23
Payer: COMMERCIAL

## 2022-11-23 ENCOUNTER — HOSPITAL ENCOUNTER (INPATIENT)
Age: 52
LOS: 1 days | Discharge: HOME | DRG: 902 | End: 2022-11-24
Attending: EMERGENCY MEDICINE | Admitting: INTERNAL MEDICINE
Payer: COMMERCIAL

## 2022-11-23 VITALS
BODY MASS INDEX: 43.36 KG/M2 | HEART RATE: 65 BPM | SYSTOLIC BLOOD PRESSURE: 115 MMHG | OXYGEN SATURATION: 99 % | TEMPERATURE: 96.6 F | WEIGHT: 254 LBS | DIASTOLIC BLOOD PRESSURE: 78 MMHG | RESPIRATION RATE: 17 BRPM | HEIGHT: 64 IN

## 2022-11-23 DIAGNOSIS — L76.82 POSTOPERATIVE SURGICAL COMPLICATION INVOLVING SUBCUTANEOUS TISSUE ASSOCIATED WITH NON-DERMATOLOGIC PROCEDURE, UNSPECIFIED COMPLICATION: Primary | ICD-10-CM

## 2022-11-23 DIAGNOSIS — Z95.1 S/P CABG X 4: ICD-10-CM

## 2022-11-23 DIAGNOSIS — T81.31XS SURGICAL WOUND BREAKDOWN, SEQUELA: Primary | ICD-10-CM

## 2022-11-23 DIAGNOSIS — T81.32XA DISRUPTION OF INTERNAL SURGICAL WOUND, INITIAL ENCOUNTER: ICD-10-CM

## 2022-11-23 DIAGNOSIS — Z95.1 S/P CABG (CORONARY ARTERY BYPASS GRAFT): ICD-10-CM

## 2022-11-23 PROBLEM — T81.329A DISRUPTION OF INTERNAL SURGICAL WOUND: Status: ACTIVE | Noted: 2022-11-23

## 2022-11-23 PROBLEM — S21.109A WOUND OF STERNAL REGION: Status: ACTIVE | Noted: 2022-11-23

## 2022-11-23 PROBLEM — T81.328A STERNAL WOUND DEHISCENCE, INITIAL ENCOUNTER: Status: ACTIVE | Noted: 2022-11-23

## 2022-11-23 LAB
ABO/RH: NORMAL
ANION GAP SERPL CALCULATED.3IONS-SCNC: 10 MMOL/L (ref 3–16)
ANION GAP SERPL CALCULATED.3IONS-SCNC: 10 MMOL/L (ref 3–16)
ANTIBODY SCREEN: NORMAL
APTT: 29.6 SEC (ref 23–34.3)
BASOPHILS ABSOLUTE: 0.1 K/UL (ref 0–0.2)
BASOPHILS RELATIVE PERCENT: 1.2 %
BUN BLDV-MCNC: 11 MG/DL (ref 7–20)
BUN BLDV-MCNC: 12 MG/DL (ref 7–20)
CALCIUM SERPL-MCNC: 8.9 MG/DL (ref 8.3–10.6)
CALCIUM SERPL-MCNC: 9.2 MG/DL (ref 8.3–10.6)
CHLORIDE BLD-SCNC: 102 MMOL/L (ref 99–110)
CHLORIDE BLD-SCNC: 104 MMOL/L (ref 99–110)
CO2: 24 MMOL/L (ref 21–32)
CO2: 27 MMOL/L (ref 21–32)
CREAT SERPL-MCNC: 0.6 MG/DL (ref 0.6–1.1)
CREAT SERPL-MCNC: <0.5 MG/DL (ref 0.6–1.1)
EOSINOPHILS ABSOLUTE: 0.2 K/UL (ref 0–0.6)
EOSINOPHILS RELATIVE PERCENT: 3.1 %
GFR SERPL CREATININE-BSD FRML MDRD: >60 ML/MIN/{1.73_M2}
GFR SERPL CREATININE-BSD FRML MDRD: >60 ML/MIN/{1.73_M2}
GLUCOSE BLD-MCNC: 164 MG/DL (ref 70–99)
GLUCOSE BLD-MCNC: 183 MG/DL (ref 70–99)
GLUCOSE BLD-MCNC: 189 MG/DL (ref 70–99)
HCT VFR BLD CALC: 38.5 % (ref 36–48)
HEMOGLOBIN: 13 G/DL (ref 12–16)
INR BLD: 1.1 (ref 0.87–1.14)
LYMPHOCYTES ABSOLUTE: 1.9 K/UL (ref 1–5.1)
LYMPHOCYTES RELATIVE PERCENT: 27.3 %
MCH RBC QN AUTO: 28.5 PG (ref 26–34)
MCHC RBC AUTO-ENTMCNC: 33.7 G/DL (ref 31–36)
MCV RBC AUTO: 84.6 FL (ref 80–100)
MONOCYTES ABSOLUTE: 0.4 K/UL (ref 0–1.3)
MONOCYTES RELATIVE PERCENT: 5.2 %
NEUTROPHILS ABSOLUTE: 4.4 K/UL (ref 1.7–7.7)
NEUTROPHILS RELATIVE PERCENT: 63.2 %
PDW BLD-RTO: 14.5 % (ref 12.4–15.4)
PERFORMED ON: ABNORMAL
PLATELET # BLD: 241 K/UL (ref 135–450)
PMV BLD AUTO: 9.7 FL (ref 5–10.5)
POTASSIUM REFLEX MAGNESIUM: 4.2 MMOL/L (ref 3.5–5.1)
POTASSIUM SERPL-SCNC: 4 MMOL/L (ref 3.5–5.1)
POTASSIUM SERPL-SCNC: 5.8 MMOL/L (ref 3.5–5.1)
PROTHROMBIN TIME: 14.1 SEC (ref 11.7–14.5)
RBC # BLD: 4.55 M/UL (ref 4–5.2)
SODIUM BLD-SCNC: 138 MMOL/L (ref 136–145)
SODIUM BLD-SCNC: 139 MMOL/L (ref 136–145)
WBC # BLD: 6.9 K/UL (ref 4–11)

## 2022-11-23 PROCEDURE — 7100000000 HC PACU RECOVERY - FIRST 15 MIN: Performed by: STUDENT IN AN ORGANIZED HEALTH CARE EDUCATION/TRAINING PROGRAM

## 2022-11-23 PROCEDURE — 3700000000 HC ANESTHESIA ATTENDED CARE: Performed by: STUDENT IN AN ORGANIZED HEALTH CARE EDUCATION/TRAINING PROGRAM

## 2022-11-23 PROCEDURE — 87186 SC STD MICRODIL/AGAR DIL: CPT

## 2022-11-23 PROCEDURE — 2580000003 HC RX 258: Performed by: NURSE ANESTHETIST, CERTIFIED REGISTERED

## 2022-11-23 PROCEDURE — A4217 STERILE WATER/SALINE, 500 ML: HCPCS | Performed by: STUDENT IN AN ORGANIZED HEALTH CARE EDUCATION/TRAINING PROGRAM

## 2022-11-23 PROCEDURE — 3700000001 HC ADD 15 MINUTES (ANESTHESIA): Performed by: STUDENT IN AN ORGANIZED HEALTH CARE EDUCATION/TRAINING PROGRAM

## 2022-11-23 PROCEDURE — 36415 COLL VENOUS BLD VENIPUNCTURE: CPT

## 2022-11-23 PROCEDURE — 7100000001 HC PACU RECOVERY - ADDTL 15 MIN: Performed by: STUDENT IN AN ORGANIZED HEALTH CARE EDUCATION/TRAINING PROGRAM

## 2022-11-23 PROCEDURE — 0JB60ZZ EXCISION OF CHEST SUBCUTANEOUS TISSUE AND FASCIA, OPEN APPROACH: ICD-10-PCS | Performed by: STUDENT IN AN ORGANIZED HEALTH CARE EDUCATION/TRAINING PROGRAM

## 2022-11-23 PROCEDURE — 21627 STERNAL DEBRIDEMENT: CPT | Performed by: STUDENT IN AN ORGANIZED HEALTH CARE EDUCATION/TRAINING PROGRAM

## 2022-11-23 PROCEDURE — 86900 BLOOD TYPING SEROLOGIC ABO: CPT

## 2022-11-23 PROCEDURE — 87070 CULTURE OTHR SPECIMN AEROBIC: CPT

## 2022-11-23 PROCEDURE — 85025 COMPLETE CBC W/AUTO DIFF WBC: CPT

## 2022-11-23 PROCEDURE — 2709999900 HC NON-CHARGEABLE SUPPLY: Performed by: STUDENT IN AN ORGANIZED HEALTH CARE EDUCATION/TRAINING PROGRAM

## 2022-11-23 PROCEDURE — 3600000012 HC SURGERY LEVEL 2 ADDTL 15MIN: Performed by: STUDENT IN AN ORGANIZED HEALTH CARE EDUCATION/TRAINING PROGRAM

## 2022-11-23 PROCEDURE — 7100000011 HC PHASE II RECOVERY - ADDTL 15 MIN: Performed by: STUDENT IN AN ORGANIZED HEALTH CARE EDUCATION/TRAINING PROGRAM

## 2022-11-23 PROCEDURE — 3600000002 HC SURGERY LEVEL 2 BASE: Performed by: STUDENT IN AN ORGANIZED HEALTH CARE EDUCATION/TRAINING PROGRAM

## 2022-11-23 PROCEDURE — 1200000000 HC SEMI PRIVATE

## 2022-11-23 PROCEDURE — 71046 X-RAY EXAM CHEST 2 VIEWS: CPT

## 2022-11-23 PROCEDURE — 2580000003 HC RX 258: Performed by: STUDENT IN AN ORGANIZED HEALTH CARE EDUCATION/TRAINING PROGRAM

## 2022-11-23 PROCEDURE — 6360000002 HC RX W HCPCS: Performed by: STUDENT IN AN ORGANIZED HEALTH CARE EDUCATION/TRAINING PROGRAM

## 2022-11-23 PROCEDURE — 6360000002 HC RX W HCPCS: Performed by: NURSE ANESTHETIST, CERTIFIED REGISTERED

## 2022-11-23 PROCEDURE — 86901 BLOOD TYPING SEROLOGIC RH(D): CPT

## 2022-11-23 PROCEDURE — 84132 ASSAY OF SERUM POTASSIUM: CPT

## 2022-11-23 PROCEDURE — 7100000010 HC PHASE II RECOVERY - FIRST 15 MIN: Performed by: STUDENT IN AN ORGANIZED HEALTH CARE EDUCATION/TRAINING PROGRAM

## 2022-11-23 PROCEDURE — 80048 BASIC METABOLIC PNL TOTAL CA: CPT

## 2022-11-23 PROCEDURE — 87075 CULTR BACTERIA EXCEPT BLOOD: CPT

## 2022-11-23 PROCEDURE — 97605 NEG PRS WND THER DME<=50SQCM: CPT

## 2022-11-23 PROCEDURE — 87116 MYCOBACTERIA CULTURE: CPT

## 2022-11-23 PROCEDURE — 87102 FUNGUS ISOLATION CULTURE: CPT

## 2022-11-23 PROCEDURE — 87205 SMEAR GRAM STAIN: CPT

## 2022-11-23 PROCEDURE — S0260 H&P FOR SURGERY: HCPCS | Performed by: STUDENT IN AN ORGANIZED HEALTH CARE EDUCATION/TRAINING PROGRAM

## 2022-11-23 PROCEDURE — 85730 THROMBOPLASTIN TIME PARTIAL: CPT

## 2022-11-23 PROCEDURE — 86850 RBC ANTIBODY SCREEN: CPT

## 2022-11-23 PROCEDURE — 87077 CULTURE AEROBIC IDENTIFY: CPT

## 2022-11-23 PROCEDURE — 2500000003 HC RX 250 WO HCPCS: Performed by: NURSE ANESTHETIST, CERTIFIED REGISTERED

## 2022-11-23 PROCEDURE — 85610 PROTHROMBIN TIME: CPT

## 2022-11-23 PROCEDURE — 21627 STERNAL DEBRIDEMENT: CPT

## 2022-11-23 PROCEDURE — 87206 SMEAR FLUORESCENT/ACID STAI: CPT

## 2022-11-23 RX ORDER — SODIUM CHLORIDE, SODIUM LACTATE, POTASSIUM CHLORIDE, CALCIUM CHLORIDE 600; 310; 30; 20 MG/100ML; MG/100ML; MG/100ML; MG/100ML
INJECTION, SOLUTION INTRAVENOUS CONTINUOUS PRN
Status: DISCONTINUED | OUTPATIENT
Start: 2022-11-23 | End: 2022-11-23 | Stop reason: SDUPTHER

## 2022-11-23 RX ORDER — ONDANSETRON 2 MG/ML
INJECTION INTRAMUSCULAR; INTRAVENOUS PRN
Status: DISCONTINUED | OUTPATIENT
Start: 2022-11-23 | End: 2022-11-23 | Stop reason: SDUPTHER

## 2022-11-23 RX ORDER — ONDANSETRON 2 MG/ML
4 INJECTION INTRAMUSCULAR; INTRAVENOUS EVERY 30 MIN PRN
Status: CANCELLED | OUTPATIENT
Start: 2022-11-23

## 2022-11-23 RX ORDER — LIDOCAINE HYDROCHLORIDE 20 MG/ML
INJECTION, SOLUTION INFILTRATION; PERINEURAL PRN
Status: DISCONTINUED | OUTPATIENT
Start: 2022-11-23 | End: 2022-11-23 | Stop reason: SDUPTHER

## 2022-11-23 RX ORDER — SODIUM CHLORIDE 0.9 % (FLUSH) 0.9 %
5-40 SYRINGE (ML) INJECTION EVERY 12 HOURS SCHEDULED
Status: DISCONTINUED | OUTPATIENT
Start: 2022-11-23 | End: 2022-11-23 | Stop reason: HOSPADM

## 2022-11-23 RX ORDER — SODIUM CHLORIDE 0.9 % (FLUSH) 0.9 %
5-40 SYRINGE (ML) INJECTION PRN
Status: DISCONTINUED | OUTPATIENT
Start: 2022-11-23 | End: 2022-11-23 | Stop reason: HOSPADM

## 2022-11-23 RX ORDER — SODIUM CHLORIDE, SODIUM LACTATE, POTASSIUM CHLORIDE, CALCIUM CHLORIDE 600; 310; 30; 20 MG/100ML; MG/100ML; MG/100ML; MG/100ML
INJECTION, SOLUTION INTRAVENOUS CONTINUOUS
Status: DISCONTINUED | OUTPATIENT
Start: 2022-11-23 | End: 2022-11-23 | Stop reason: HOSPADM

## 2022-11-23 RX ORDER — MEPERIDINE HYDROCHLORIDE 50 MG/ML
12.5 INJECTION INTRAMUSCULAR; INTRAVENOUS; SUBCUTANEOUS EVERY 5 MIN PRN
Status: CANCELLED | OUTPATIENT
Start: 2022-11-23

## 2022-11-23 RX ORDER — SODIUM CHLORIDE 9 MG/ML
INJECTION, SOLUTION INTRAVENOUS PRN
Status: DISCONTINUED | OUTPATIENT
Start: 2022-11-23 | End: 2022-11-23 | Stop reason: HOSPADM

## 2022-11-23 RX ORDER — MAGNESIUM HYDROXIDE 1200 MG/15ML
LIQUID ORAL CONTINUOUS PRN
Status: COMPLETED | OUTPATIENT
Start: 2022-11-23 | End: 2022-11-23

## 2022-11-23 RX ORDER — OXYCODONE HYDROCHLORIDE 5 MG/1
5 TABLET ORAL
Status: CANCELLED | OUTPATIENT
Start: 2022-11-23 | End: 2022-11-24

## 2022-11-23 RX ORDER — OXYCODONE HYDROCHLORIDE 5 MG/1
5 TABLET ORAL EVERY 6 HOURS PRN
Qty: 20 TABLET | Refills: 0 | Status: ON HOLD | OUTPATIENT
Start: 2022-11-23 | End: 2022-11-24 | Stop reason: HOSPADM

## 2022-11-23 RX ORDER — EPINEPHRINE 1 MG/ML
INJECTION, SOLUTION, CONCENTRATE INTRAVENOUS PRN
Status: DISCONTINUED | OUTPATIENT
Start: 2022-11-23 | End: 2022-11-23 | Stop reason: SDUPTHER

## 2022-11-23 RX ORDER — MIDAZOLAM HYDROCHLORIDE 1 MG/ML
2 INJECTION INTRAMUSCULAR; INTRAVENOUS
Status: CANCELLED | OUTPATIENT
Start: 2022-11-23 | End: 2022-11-24

## 2022-11-23 RX ORDER — CLINDAMYCIN HYDROCHLORIDE 150 MG/1
150 CAPSULE ORAL EVERY 6 HOURS SCHEDULED
Status: CANCELLED | OUTPATIENT
Start: 2022-11-23

## 2022-11-23 RX ORDER — CLINDAMYCIN HYDROCHLORIDE 300 MG/1
300 CAPSULE ORAL 4 TIMES DAILY
Qty: 28 CAPSULE | Refills: 0 | Status: SHIPPED | OUTPATIENT
Start: 2022-11-23 | End: 2022-11-30

## 2022-11-23 RX ORDER — DIPHENHYDRAMINE HYDROCHLORIDE 50 MG/ML
6.25 INJECTION INTRAMUSCULAR; INTRAVENOUS
Status: CANCELLED | OUTPATIENT
Start: 2022-11-23 | End: 2022-11-24

## 2022-11-23 RX ORDER — SODIUM CHLORIDE 9 MG/ML
25 INJECTION, SOLUTION INTRAVENOUS PRN
Status: CANCELLED | OUTPATIENT
Start: 2022-11-23

## 2022-11-23 RX ORDER — SODIUM CHLORIDE 0.9 % (FLUSH) 0.9 %
5-40 SYRINGE (ML) INJECTION EVERY 12 HOURS SCHEDULED
Status: CANCELLED | OUTPATIENT
Start: 2022-11-23

## 2022-11-23 RX ORDER — LIDOCAINE HYDROCHLORIDE 10 MG/ML
1 INJECTION, SOLUTION EPIDURAL; INFILTRATION; INTRACAUDAL; PERINEURAL
Status: DISCONTINUED | OUTPATIENT
Start: 2022-11-23 | End: 2022-11-23 | Stop reason: HOSPADM

## 2022-11-23 RX ORDER — SODIUM CHLORIDE 0.9 % (FLUSH) 0.9 %
5-40 SYRINGE (ML) INJECTION PRN
Status: CANCELLED | OUTPATIENT
Start: 2022-11-23

## 2022-11-23 RX ORDER — PROPOFOL 10 MG/ML
INJECTION, EMULSION INTRAVENOUS PRN
Status: DISCONTINUED | OUTPATIENT
Start: 2022-11-23 | End: 2022-11-23 | Stop reason: SDUPTHER

## 2022-11-23 RX ORDER — FENTANYL CITRATE 50 UG/ML
INJECTION, SOLUTION INTRAMUSCULAR; INTRAVENOUS PRN
Status: DISCONTINUED | OUTPATIENT
Start: 2022-11-23 | End: 2022-11-23 | Stop reason: SDUPTHER

## 2022-11-23 RX ORDER — PHENYLEPHRINE HCL IN 0.9% NACL 1 MG/10 ML
SYRINGE (ML) INTRAVENOUS PRN
Status: DISCONTINUED | OUTPATIENT
Start: 2022-11-23 | End: 2022-11-23 | Stop reason: SDUPTHER

## 2022-11-23 RX ADMIN — CEFAZOLIN 2 G: 10 INJECTION, POWDER, FOR SOLUTION INTRAVENOUS at 07:54

## 2022-11-23 RX ADMIN — EPINEPHRINE 5 MCG: 1 INJECTION, SOLUTION, CONCENTRATE INTRAVENOUS at 08:22

## 2022-11-23 RX ADMIN — Medication 80 MCG: at 08:23

## 2022-11-23 RX ADMIN — PROPOFOL 200 MG: 10 INJECTION, EMULSION INTRAVENOUS at 08:00

## 2022-11-23 RX ADMIN — FENTANYL CITRATE 100 MCG: 50 INJECTION INTRAMUSCULAR; INTRAVENOUS at 07:54

## 2022-11-23 RX ADMIN — Medication 40 MCG: at 08:17

## 2022-11-23 RX ADMIN — EPINEPHRINE 5 MCG: 1 INJECTION, SOLUTION, CONCENTRATE INTRAVENOUS at 08:14

## 2022-11-23 RX ADMIN — SODIUM CHLORIDE, SODIUM LACTATE, POTASSIUM CHLORIDE, AND CALCIUM CHLORIDE: .6; .31; .03; .02 INJECTION, SOLUTION INTRAVENOUS at 07:54

## 2022-11-23 RX ADMIN — EPINEPHRINE 5 MCG: 1 INJECTION, SOLUTION, CONCENTRATE INTRAVENOUS at 08:16

## 2022-11-23 RX ADMIN — ONDANSETRON 4 MG: 2 INJECTION INTRAMUSCULAR; INTRAVENOUS at 08:00

## 2022-11-23 RX ADMIN — LIDOCAINE HYDROCHLORIDE 100 MG: 20 INJECTION, SOLUTION INFILTRATION; PERINEURAL at 08:00

## 2022-11-23 RX ADMIN — EPINEPHRINE 5 MCG: 1 INJECTION, SOLUTION, CONCENTRATE INTRAVENOUS at 08:24

## 2022-11-23 RX ADMIN — Medication 40 MCG: at 08:14

## 2022-11-23 RX ADMIN — EPINEPHRINE 5 MCG: 1 INJECTION, SOLUTION, CONCENTRATE INTRAVENOUS at 08:18

## 2022-11-23 RX ADMIN — Medication 80 MCG: at 08:20

## 2022-11-23 RX ADMIN — EPINEPHRINE 5 MCG: 1 INJECTION, SOLUTION, CONCENTRATE INTRAVENOUS at 08:20

## 2022-11-23 ASSESSMENT — PAIN - FUNCTIONAL ASSESSMENT
PAIN_FUNCTIONAL_ASSESSMENT: NONE - DENIES PAIN
PAIN_FUNCTIONAL_ASSESSMENT: NONE - DENIES PAIN

## 2022-11-23 NOTE — PROGRESS NOTES
Patient admitted to [address]. Consents verified. Patient NPO since 2100 last night. All patient belongings to remain in room locker, including phone and ring.

## 2022-11-23 NOTE — PROGRESS NOTES
Reviewed d/c instructions with pt and  / verbalized understanding. Pt aware that home sean with visit on Friday 11/25.

## 2022-11-23 NOTE — H&P
[facility]  H&P    11/23/2022 7:30 AM  Surgeon:  Tran Velazquez     S/P :   Curt Gutiérrez in place - wound check* S/p post-op s/p CABG x3, JOHN, edarterectomy, ANATOLIY clip, & bilateral PB on 10/10/22 w/ MOM. Chief complaint : wound check  Subjective:  Ms. Flavio Garcia is doing well today. She has had pravena in place since last Tuesday. She has been following a very strict diet. Averaging about 150 BS wise. Saw yesterday in clinic with some exposed sutures and tension on wound    Vital Signs: /82   Pulse 82   Temp (!) 96.6 °F (35.9 °C) (Temporal)   Resp 16   Ht 5' 4\" (1.626 m)   Wt 254 lb (115.2 kg)   LMP 12/13/2016   SpO2 100%   BMI 43.60 kg/m²      I/O:  No intake or output data in the 24 hours ending 11/23/22 0730    Exam:   Cardiovascular: Clear S1, S2. No MRG. Pulmonary: CTAB    Lower extremity: no peripheral edema  Incision: MSI inferior portion remain open after debridement in office last week, pink, no evidence of purulent drainage. CT sites CDI. RLE harvest site CDI. Labs:   CBC: No results for input(s): WBC, HGB, HCT, MCV, PLT in the last 72 hours. BMP:   Recent Labs     11/23/22  0629      K 5.8*      CO2 24   BUN 12   CREATININE <0.5*     PT/INR: No results for input(s): PROTIME, INR in the last 72 hours. APTT: No results for input(s): APTT in the last 72 hours.     Scheduled Meds:     Patient Active Problem List   Diagnosis    Coronary artery disease involving native coronary artery of native heart with unstable angina pectoris (HCC)    Ischemic cardiomyopathy    CAD, multiple vessel       Assessment/Plan:   S/p post-op s/p CABG x3, JOHN, edarterectomy, ANATOLIY clip, & bilateral PB on 10/10/22 w/ MOM      Wound vac and debridement today    Geraldine Stinson MD

## 2022-11-23 NOTE — ANESTHESIA POSTPROCEDURE EVALUATION
Department of Anesthesiology  Postprocedure Note    Patient: Johnna Hernandez  MRN: [id number]  YOB: 1970  Date of evaluation: 11/23/2022      Procedure Summary     Date: 11/23/22 Room / Location: [place] [address] / [facility]    Anesthesia Start: 4050 Anesthesia Stop: 8388    Procedure: STERNAL WOUND DEBRIDEMENT WITH WOUND VACUUM PLACEMENT Diagnosis:       Disruption of internal surgical wound, initial encounter      (STERNAL DEHISCENSE)    Surgeons: Alfonso Smith MD Responsible Provider: Kylee Roy MD    Anesthesia Type: general ASA Status: 3          Anesthesia Type: No value filed.     Micah Phase I: Micah Score: 10    Micah Phase II:        Anesthesia Post Evaluation    Comments: Postoperative Anesthesia Note    Name:    Johnna Hernandez  MRN:      [id number]    Patient Vitals in the past 12 hrs:  11/23/22 1015, BP:115/78, Pulse:65, Resp:17, SpO2:99 %  11/23/22 0958, Pulse:65, Resp:(!) 0, SpO2:96 %  11/23/22 0957, Pulse:66, Resp:18, SpO2:97 %  11/23/22 0956, Pulse:67, Resp:10, SpO2:97 %  11/23/22 0955, BP:(!) 100/53, Pulse:66, Resp:10, SpO2:97 %  11/23/22 0954, Pulse:67, Resp:19, SpO2:96 %  11/23/22 0953, Pulse:67, Resp:16, SpO2:96 %  11/23/22 0952, Pulse:65, Resp:11, SpO2:96 %  11/23/22 0951, BP:(!) 94/52, Pulse:66, Resp:10, SpO2:97 %  11/23/22 0950, BP:(!) 63/47, Pulse:72, Resp:18, SpO2:95 %  11/23/22 0949, Pulse:69, Resp:(!) 8, SpO2:95 %  11/23/22 0948, Pulse:70, Resp:12, SpO2:94 %  11/23/22 0947, Pulse:69, Resp:(!) 9, SpO2:95 %  11/23/22 0946, Pulse:69, Resp:16, SpO2:96 %  11/23/22 0945, BP:116/63, Pulse:69, Resp:22, SpO2:99 %  11/23/22 0944, Pulse:67, Resp:13, SpO2:99 %  11/23/22 0943, Pulse:80, Resp:13, SpO2:99 %  11/23/22 0942, Pulse:75, Resp:17, SpO2:98 %  11/23/22 0941, Pulse:71, Resp:21, SpO2:100 %  11/23/22 0940, BP:(!) 105/53, Pulse:67, Resp:15, SpO2:96 %  11/23/22 0939, Pulse:70, Resp:18, SpO2:95 %  11/23/22 0938, Pulse:67, Resp:17, SpO2:95 %  11/23/22 0937, Pulse:68, Resp:16, SpO2:95 %  11/23/22 0936, Pulse:65, Resp:15, SpO2:93 %  11/23/22 0935, BP:(!) 110/57, Pulse:64, Resp:13, SpO2:95 %  11/23/22 0934, Pulse:69, Resp:20, SpO2:96 %  11/23/22 0933, Pulse:66, Resp:12, SpO2:96 %  11/23/22 0932, Pulse:67, Resp:11, SpO2:98 %  11/23/22 0931, Pulse:65, Resp:12, SpO2:98 %  11/23/22 0930, BP:109/62, Pulse:66, Resp:15, SpO2:97 %  11/23/22 0929, Pulse:69, Resp:14, SpO2:96 %  11/23/22 0928, Pulse:66, Resp:13, SpO2:95 %  11/23/22 0927, Pulse:66, Resp:14, SpO2:96 %  11/23/22 0926, Pulse:67, Resp:17, SpO2:95 %  11/23/22 0925, Pulse:68, Resp:10, SpO2:94 %  11/23/22 0924, BP:111/60, Pulse:67, Resp:16, SpO2:95 %  11/23/22 0923, Pulse:66, Resp:14, SpO2:96 %  11/23/22 0922, Pulse:66, Resp:13, SpO2:97 %  11/23/22 0921, Pulse:66, Resp:12, SpO2:96 %  11/23/22 0920, Pulse:66, Resp:15, SpO2:100 %  11/23/22 0919, BP:120/66, Pulse:66, Resp:16, SpO2:97 %  11/23/22 0918, Pulse:65, Resp:15, SpO2:98 %  11/23/22 0917, Pulse:63, Resp:17, SpO2:99 %  11/23/22 0916, Pulse:72, Resp:18, SpO2:96 %  11/23/22 0915, BP:(!) 105/55, Pulse:65, Resp:13, SpO2:98 %  11/23/22 0914, Pulse:67, Resp:18, SpO2:100 %  11/23/22 0913, Pulse:65, Resp:15, SpO2:99 %  11/23/22 0912, Pulse:66, Resp:(!) 9, SpO2:99 %  11/23/22 0911, Pulse:65, Resp:18, SpO2:99 %  11/23/22 0910, BP:125/73, Pulse:66, Resp:18, SpO2:99 %  11/23/22 0909, Pulse:69, Resp:20, SpO2:100 %  11/23/22 0908, Pulse:70, Resp:27, SpO2:99 %  11/23/22 0907, Pulse:71, Resp:20, SpO2:100 %  11/23/22 0906, Pulse:68, Resp:14, SpO2:94 %  11/23/22 0905, BP:120/63, Pulse:66, Resp:15, SpO2:98 %  11/23/22 0904, Pulse:67, Resp:15, SpO2:99 %  11/23/22 0903, Pulse:67, Resp:17, SpO2:99 %  11/23/22 0902, Pulse:66, Resp:15, SpO2:100 %  11/23/22 0901, Pulse:70, Resp:18, SpO2:99 %  11/23/22 0900, BP:124/69, Pulse:70, Resp:19, SpO2:100 %  11/23/22 0859, Pulse:72, Resp:22, SpO2:100 %  11/23/22 0858, Pulse:68, Resp:15, SpO2:100 %  11/23/22 0857, Pulse:71, Resp:23, SpO2:97 %  11/23/22 8643, Pulse:68, Resp:14, SpO2:98 %  11/23/22 0855, BP:126/73, Pulse:68, Resp:17, SpO2:100 %  11/23/22 0854, Pulse:73, Resp:19, SpO2:96 %  11/23/22 0853, Pulse:70, Resp:13, SpO2:99 %  11/23/22 0852, Pulse:69, Resp:15, SpO2:99 %  11/23/22 0851, Pulse:79, Resp:21, SpO2:99 %  11/23/22 0850, BP:106/60, Pulse:74, Resp:18, SpO2:100 %  11/23/22 0849, Pulse:73, Resp:21, SpO2:99 %  11/23/22 0848, Pulse:75, Resp:23, SpO2:99 %  11/23/22 0847, Pulse:77, Resp:26, SpO2:98 %  11/23/22 0846, Pulse:74, Resp:15, SpO2:99 %  11/23/22 0845, BP:107/71, Pulse:78, Resp:16, SpO2:98 %  11/23/22 0844, Pulse:76, Resp:19, SpO2:98 %  11/23/22 0843, Pulse:75, Resp:13, SpO2:99 %  11/23/22 0842, BP:(!) 102/56, Pulse:82, Resp:16, SpO2:99 %  11/23/22 0606, BP:115/82, Temp:(!) 96.6 °F (35.9 °C), Temp src:Temporal, Pulse:82, Resp:16, SpO2:100 %, Height:5' 4\" (1.626 m), Weight:254 lb (115.2 kg)     LABS:    CBC  Lab Results       Component                Value               Date/Time                  WBC                      6.1                 10/14/2022 04:25 AM        HGB                      11.0 (L)            10/14/2022 04:25 AM        HCT                      32.7 (L)            10/14/2022 04:25 AM        PLT                      220                 10/14/2022 04:25 AM   RENAL  Lab Results       Component                Value               Date/Time                  NA                       138                 11/23/2022 06:29 AM        K                        4.0                 11/23/2022 07:15 AM        K                        3.9                 10/07/2022 03:03 AM        CL                       104                 11/23/2022 06:29 AM        CO2                      24                  11/23/2022 06:29 AM        BUN                      12                  11/23/2022 06:29 AM        CREATININE               <0.5 (L)            11/23/2022 06:29 AM        GLUCOSE                  183 (H)             11/23/2022 06:29 AM   COAGS  Lab Results Component                Value               Date/Time                  PROTIME                  15.4 (H)            10/12/2022 04:15 AM        INR                      1.23 (H)            10/12/2022 04:15 AM        APTT                     26.4                10/10/2022 12:11 PM     Intake & Output: In: 600 (I.V.:500)  Out: 0     Nausea & Vomiting:  No    Level of Consciousness:  Awake    Pain Assessment:  Adequate analgesia    Anesthesia Complications:  No apparent anesthetic complications    SUMMARY      Vital signs stable  OK to discharge from Stage I post anesthesia care.   Care transferred from Anesthesiology department on discharge from perioperative area

## 2022-11-23 NOTE — BRIEF OP NOTE
Brief Postoperative Note      Patient: Lois Stapleton  YOB: 1970  MRN: [id number]    Date of Procedure: 11/23/2022    Pre-Op Diagnosis: STERNAL DEHISCENSE    Post-Op Diagnosis: Same       Procedure(s):  STERNAL WOUND DEBRIDEMENT WITH WOUND VACUUM PLACEMENT    Surgeon(s):  Serina Howell MD    Assistant:  Surgical Assistant: Charley Ayala  Physician Assistant: ANJALI Delgado    Anesthesia: General    Estimated Blood Loss (mL): Minimal    Complications: None    Specimens:   ID Type Source Tests Collected by Time Destination   1 : STERNAL WOUND FOR CULTURE Body Fluid Fluid CULTURE, FUNGUS, CULTURE, SURGICAL, CULTURE WITH SMEAR, ACID FAST Steve Lee MD 11/23/2022 0820        Implants:  * No implants in log *      Drains:   Negative Pressure Wound Therapy Chest (Active)       Findings:     Electronically signed by ANJALI Delgado on 11/23/2022 at 8:31 AM

## 2022-11-23 NOTE — PROGRESS NOTES
As pt was being wheeled out to car for d/c home, wound vac began to alarm. Pt was brought back to [facility] (phase II) to assess an troubleshoot.   Deborah ASTORGA) was called back to change dressing (was leaking)

## 2022-11-23 NOTE — ANESTHESIA PRE PROCEDURE
Department of Anesthesiology  Preprocedure Note       Name:  Tan Benedict   Age:  47 y.o.  :  1970                                          MRN:  [id number]         Date:  2022      Surgeon: Yuliya Hill):  Diego Burnette MD    Procedure: Procedure(s):  STERNAL WOUND DEBRIDEMENT WITH WOUND VACUUM PLACEMENT    Medications prior to admission:   Prior to Admission medications    Medication Sig Start Date End Date Taking?  Authorizing Provider   lisinopril (PRINIVIL;ZESTRIL) 2.5 MG tablet Take 1 tablet by mouth daily  Patient not taking: Reported on 2022   ANDREWS Avalos - CNP   aspirin 81 MG EC tablet Take 1 tablet by mouth daily 22  ANJALI Cope   atorvastatin (LIPITOR) 40 MG tablet Take 1 tablet by mouth nightly 22  ANJALI Cope   clopidogrel (PLAVIX) 75 MG tablet Take 1 tablet by mouth daily 22  ANJALI Cope   carvedilol (COREG) 6.25 MG tablet Take 1 tablet by mouth 2 times daily Hold for SBP <100 or HR <65 10/26/22   ANJALI Ambriz   insulin aspart (NOVOLOG) 100 UNIT/ML injection pen Inject 4 Units into the skin 3 times daily (before meals) 10/14/22 11/23/22  [address] [place], [place] [address]D[facility]   insulin glargine (LANTUS;BASAGLAR) 100 UNIT/ML injection pen Inject 12 Units into the skin nightly 10/14/22 11/23/22  [address] [place] [address] [facility]       Current medications:    Current Facility-Administered Medications   Medication Dose Route Frequency Provider Last Rate Last Admin    lidocaine PF 1 % injection 1 mL  1 mL IntraDERmal Once PRN Regi Garcia MD        lactated ringers infusion   IntraVENous Continuous Regi Garcia MD        sodium chloride flush 0.9 % injection 5-40 mL  5-40 mL IntraVENous 2 times per day Regi Garcia MD        sodium chloride flush 0.9 % injection 5-40 mL  5-40 mL IntraVENous PRN Regi Garcia MD        0.9 % sodium chloride infusion   IntraVENous PRN Regi Garcia MD           Allergies:  No Known Allergies    Problem List:    Patient Active Problem List   Diagnosis Code    Coronary artery disease involving native coronary artery of native heart with unstable angina pectoris (HCC) I25.110    Ischemic cardiomyopathy I25.5    CAD, multiple vessel I25.10       Past Medical History:        Diagnosis Date    Anemia     Diabetes mellitus ([facility] [address]     Hypertension     Thyroid disease     PARTIAL REMOVAL FOR NODULES       Past Surgical History:        Procedure Laterality Date     SECTION      X2    CHOLECYSTECTOMY      CORONARY ARTERY BYPASS GRAFT N/A 10/10/2022    CORONARY ARTERY BYPASS GRAFT TIMES THREE WITH POSSIBLE LEFT ATRIAL APPENDAGE CLIP PLACEMENT, JOHN, TEMPORARY PACING WIRES,  INTERNAL MAMMARY ARTERY AND SAPHNEOUS VEIN GRAFT, EDARTERECTOMY, BILATERAL PECTORALIS NERVE BLOCK FOR PAIN CONTROL performed by Emma De Souza MD at [address] [place] [address])  2016    total vaginal    THYROIDECTOMY Bilateral 2013    Subtotal thyroidectomy, right thyroid lobe, partial left thyroid lobe       Social History:    Social History     Tobacco Use    Smoking status: Never    Smokeless tobacco: Never   Substance Use Topics    Alcohol use:  No                                Counseling given: Not Answered      Vital Signs (Current):   Vitals:    22 1437 22 0606   BP:  115/82   Pulse:  82   Resp:  16   Temp:  (!) 96.6 °F (35.9 °C)   TempSrc:  Temporal   SpO2:  100%   Weight: 253 lb (114.8 kg) 254 lb (115.2 kg)   Height: 5' 4\" (1.626 m) 5' 4\" (1.626 m)                                              BP Readings from Last 3 Encounters:   22 115/82   22 109/78   22 118/68       NPO Status: Time of last liquid consumption:                         Time of last solid consumption:                         Date of last liquid consumption: 22                        Date of last solid food consumption: 11/22/22    BMI:   Wt Readings from Last 3 Encounters:   11/23/22 254 lb (115.2 kg)   11/22/22 253 lb (114.8 kg)   11/17/22 254 lb 12.8 oz (115.6 kg)     Body mass index is 43.6 kg/m².     CBC:   Lab Results   Component Value Date/Time    WBC 6.1 10/14/2022 04:25 AM    RBC 3.70 10/14/2022 04:25 AM    HGB 11.0 10/14/2022 04:25 AM    HCT 32.7 10/14/2022 04:25 AM    MCV 88.6 10/14/2022 04:25 AM    RDW 13.8 10/14/2022 04:25 AM     10/14/2022 04:25 AM       CMP:   Lab Results   Component Value Date/Time     11/23/2022 06:29 AM    K 5.8 11/23/2022 06:29 AM    K 3.9 10/07/2022 03:03 AM     11/23/2022 06:29 AM    CO2 24 11/23/2022 06:29 AM    BUN 12 11/23/2022 06:29 AM    CREATININE <0.5 11/23/2022 06:29 AM    GFRAA >60 10/14/2022 04:25 AM    GFRAA >60 03/29/2010 10:39 AM    AGRATIO 1.4 10/07/2022 03:03 AM    LABGLOM >60 11/23/2022 06:29 AM    GLUCOSE 183 11/23/2022 06:29 AM    PROT 5.9 10/10/2022 03:24 AM    PROT 7.1 03/29/2010 10:39 AM    CALCIUM 9.2 11/23/2022 06:29 AM    BILITOT 0.6 10/10/2022 03:24 AM    ALKPHOS 83 10/10/2022 03:24 AM    AST 14 10/10/2022 03:24 AM    ALT 14 10/10/2022 03:24 AM       POC Tests:   Recent Labs     11/23/22  0640   POCGLU 189*       Coags:   Lab Results   Component Value Date/Time    PROTIME 15.4 10/12/2022 04:15 AM    INR 1.23 10/12/2022 04:15 AM    APTT 26.4 10/10/2022 12:11 PM       HCG (If Applicable):   Lab Results   Component Value Date    PREGTESTUR Negative 12/29/2016        ABGs:   Lab Results   Component Value Date/Time    PHART 7.413 10/10/2022 05:59 PM    PO2ART 74.6 10/10/2022 05:59 PM    JDE9CRA 34.6 10/10/2022 05:59 PM    EOI8KNM 22.1 10/10/2022 05:59 PM    BEART -3 10/10/2022 05:59 PM    J0YHPQTU 95 10/10/2022 05:59 PM        Type & Screen (If Applicable):  No results found for: LABABO, LABRH    Drug/Infectious Status (If Applicable):  No results found for: HIV, HEPCAB    COVID-19 Screening (If Applicable): No results found for: COVID19        Anesthesia Evaluation  Patient summary reviewed and Nursing notes reviewed no history of anesthetic complications:   Airway: Mallampati: III     Neck ROM: full     Dental:          Pulmonary:                              Cardiovascular:    (+) hypertension:, angina:, CAD:, CABG/stent:,                   Neuro/Psych:               GI/Hepatic/Renal:   (+) morbid obesity          Endo/Other:    (+) Diabetes, . Abdominal:             Vascular: Other Findings:           Anesthesia Plan      general     ASA 3     (Medications & allergies reviewed  All available lab & EKG data reviewed)  Induction: intravenous. Anesthetic plan and risks discussed with patient. Plan discussed with CRNA.                     Didier Tinajero MD   11/23/2022

## 2022-11-23 NOTE — TELEPHONE ENCOUNTER
Patient has wound vac in place for sternum, which was debrided in OR this morning. Patient has been having alarms on device. At the current time, the machine is showing that it is clogged. She has been communicating directly with our PA, Marciano Dandy. Per Marciano Dandy, patient needs urgent [place] [address] visit this afternoon to place wet to dry dressing on sternum & education on changing wet to dry dressings to get her through until wound vac can be placed again on Friday. The [facility] nurse is agreeable to visit patient & is going to give patient her phone number since she is on call tomorrow as well. Pallavi Jimenez., CVTS PA has reviewed this situation in depth with Dr. Miller [place] Dr. Jordyn Vera. Everyone is in agreement at this time that patient can remain at home with wet to dry dressings, with new wound vac dressing placed this Friday. Patient was educated on when it would be appropriate to go to ED by PA. Patient is aware that we have a surgeon on call 24/7.

## 2022-11-23 NOTE — OP NOTE
Operative Note      Patient: Tan Benedict  YOB: 1970  MRN: [id number]    Date of Procedure: 11/23/2022    Pre-Op Diagnosis: sternal wound dehiscence    Post-Op Diagnosis: Same       Procedure(s):  STERNAL WOUND DEBRIDEMENT WITH WOUND VACUUM PLACEMENT    Surgeon(s):  Diego Burnette MD    Assistant:   Surgical Assistant: Gucci Sher  Physician Assistant: ANJALI Cope    Anesthesia: General    Estimated Blood Loss (mL): Minimal    Complications: None    Specimens:   ID Type Source Tests Collected by Time Destination   1 : STERNAL WOUND FOR CULTURE Body Fluid Fluid CULTURE, FUNGUS, CULTURE, SURGICAL, CULTURE WITH SMEAR, ACID FAST Karolina Galvan MD 11/23/2022 0820        Implants:  * No implants in log *      Drains:   Negative Pressure Wound Therapy Chest (Active)       Findings: 7 x 3 x 3 wound, no exposed wires    Detailed Description of Procedure:   After informed consent was obtained, she was brought to the operating room placed in supine position. General anesthesia was induced without difficulty. Timeout was performed correct informing correct patient and procedure. Wet prep was used given her open wound. It was allowed to dry. She was draped in sterile fashion    First a small skin bridge was excised connecting the 2 small wounds in 1 cavity. The wound was probed and examined and there were no exposed sternal wires. The fascia was covered. The wound was swabbed though there was no purulence. Any granulation tissue was gently debrided. All suture material that was visible was removed. The wound measured 7 x 3 x 3 cm. It was irrigated copiously. A small black foam wound VAC sponge was placed in the shape of the wound followed by dressings. Seal was excellent. She was awakened taken to PACU in satisfactory condition I was present scrubbed for the duration of the case, at the end of the case all sponge and needle counts were correct.     Electronically signed by Obed Huggins MD on 11/23/2022 at 8:51 AM

## 2022-11-23 NOTE — DISCHARGE INSTR - COC
breakdown, sequela T81.31XS       Isolation/Infection:   Isolation            No Isolation          Patient Infection Status       None to display            Nurse Assessment:  Last Vital Signs: BP (!) 102/56   Pulse 82   Temp (!) 96.6 °F (35.9 °C) (Temporal)   Resp 16   Ht 5' 4\" (1.626 m)   Wt 254 lb (115.2 kg)   LMP 2016   SpO2 99%   BMI 43.60 kg/m²     Last documented pain score (0-10 scale):    Last Weight:   Wt Readings from Last 1 Encounters:   22 254 lb (115.2 kg)     Mental Status:  {IP PT MENTAL STATUS:}    IV Access:  { ASHISH IV ACCESS:413319201}    Nursing Mobility/ADLs:  Walking   {P DME VLRN:902245623}  Transfer  {P DME FZAY:439731106}  Bathing  {[facility] DME EZOF:252277819}  Dressing  {[facility] DME IOQW:438670784}  Toileting  {[facility] DME ALKS:567663445}  Feeding  {[facility] DME JK}  Med Admin  {[facility] DME TQOO:455164303}  Med Delivery   { ASHISH MED Delivery:382985070}    Wound Care Documentation and Therapy:  Negative Pressure Wound Therapy Chest (Active)   Number of days: 0       Incision 10/10/22 Knee Right;Medial;Other (Comment) (Active)   Number of days: 44       Incision 10/10/22 Sternum Anterior (Active)   Number of days: 44        Elimination:  Continence: Bowel: {YES / YN:35858}  Bladder: {YES / VV:25280}  Urinary Catheter: {Urinary Catheter:251134383}   Colostomy/Ileostomy/Ileal Conduit: {YES / QX:51337}       Date of Last BM: ***    Intake/Output Summary (Last 24 hours) at 2022 0951  Last data filed at 2022 0835  Gross per 24 hour   Intake 600 ml   Output 0 ml   Net 600 ml     No intake/output data recorded.     Safety Concerns:     [address] Safety Concerns:102210396}    Impairments/Disabilities:      [address] Impairments/Disabilities:151932591}    Nutrition Therapy:  Current Nutrition Therapy:   [address] Diet List:726737941}    Routes of Feeding: {CHP DME Other Feedings:875995030}  Liquids: {Slp liquid thickness:49912}  Daily Fluid Restriction: {GENESIS OTT Yes amt RUANSXU:764088301}  Last Modified Barium Swallow with Video (Video Swallowing Test): {Done Not Done RREN:816735094}    Treatments at the Time of Hospital Discharge:   Respiratory Treatments: ***  Oxygen Therapy:  {Therapy; copd oxygen:43438}  Ventilator:    { CC Vent SYWJ:773320689}    Rehab Therapies: {THERAPEUTIC INTERVENTION:7419739008}  Weight Bearing Status/Restrictions: { CC Weight Bearin}  Other Medical Equipment (for information only, NOT a DME order):  {EQUIPMENT:449175712}  Other Treatments: ***    Patient's personal belongings (please select all that are sent with patient):  {CHP DME Belongings:002623122}    RN SIGNATURE:  {Esignature:994750473}    CASE MANAGEMENT/SOCIAL WORK SECTION    Inpatient Status Date: ***    Readmission Risk Assessment Score:  Readmission Risk              Risk of Unplanned Readmission:  0           Discharging to Facility/ Agency   Name: [facility]  Address:  Phone:  Fax:        / signature: {Esignature:573949164}    PHYSICIAN SECTION    Prognosis: Good    Condition at Discharge: Stable    Rehab Potential (if transferring to Rehab): N/A    Recommended Labs or Other Treatments After Discharge:   Wound vac changes 3 x weekly. Provider (myself - ANJALI Tipton) will change every Tuesday. Will need changes  per [id number] [place] [address]. However please change first time this Friday, and then next week we will get on / schedule. Physician Certification: I certify the above information and transfer of Keila Day  is necessary for the continuing treatment of the diagnosis listed and that she requires Home Care for less 30 days.      Update Admission H&P: No change in H&P    PHYSICIAN SIGNATURE:  Electronically signed by ANJALI Tipton on 22 at 9:53 AM EST

## 2022-11-24 VITALS
BODY MASS INDEX: 43.71 KG/M2 | OXYGEN SATURATION: 96 % | HEART RATE: 114 BPM | SYSTOLIC BLOOD PRESSURE: 107 MMHG | TEMPERATURE: 97.4 F | DIASTOLIC BLOOD PRESSURE: 70 MMHG | HEIGHT: 64 IN | WEIGHT: 256 LBS | RESPIRATION RATE: 16 BRPM

## 2022-11-24 LAB
ANION GAP SERPL CALCULATED.3IONS-SCNC: 10 MMOL/L (ref 3–16)
BASOPHILS ABSOLUTE: 0 K/UL (ref 0–0.2)
BASOPHILS RELATIVE PERCENT: 0.6 %
BUN BLDV-MCNC: 12 MG/DL (ref 7–20)
CALCIUM SERPL-MCNC: 8.5 MG/DL (ref 8.3–10.6)
CHLORIDE BLD-SCNC: 104 MMOL/L (ref 99–110)
CO2: 26 MMOL/L (ref 21–32)
CREAT SERPL-MCNC: <0.5 MG/DL (ref 0.6–1.1)
EOSINOPHILS ABSOLUTE: 0.1 K/UL (ref 0–0.6)
EOSINOPHILS RELATIVE PERCENT: 2.3 %
FUNGUS STAIN: NORMAL
GFR SERPL CREATININE-BSD FRML MDRD: >60 ML/MIN/{1.73_M2}
GLUCOSE BLD-MCNC: 197 MG/DL (ref 70–99)
GLUCOSE BLD-MCNC: 199 MG/DL (ref 70–99)
GLUCOSE BLD-MCNC: 210 MG/DL (ref 70–99)
HCT VFR BLD CALC: 33.5 % (ref 36–48)
HEMOGLOBIN: 11 G/DL (ref 12–16)
LYMPHOCYTES ABSOLUTE: 1.9 K/UL (ref 1–5.1)
LYMPHOCYTES RELATIVE PERCENT: 28.7 %
MCH RBC QN AUTO: 28.5 PG (ref 26–34)
MCHC RBC AUTO-ENTMCNC: 32.8 G/DL (ref 31–36)
MCV RBC AUTO: 86.9 FL (ref 80–100)
MONOCYTES ABSOLUTE: 0.4 K/UL (ref 0–1.3)
MONOCYTES RELATIVE PERCENT: 6.7 %
NEUTROPHILS ABSOLUTE: 4 K/UL (ref 1.7–7.7)
NEUTROPHILS RELATIVE PERCENT: 61.7 %
PDW BLD-RTO: 14.7 % (ref 12.4–15.4)
PERFORMED ON: ABNORMAL
PERFORMED ON: ABNORMAL
PLATELET # BLD: 220 K/UL (ref 135–450)
PMV BLD AUTO: 10.1 FL (ref 5–10.5)
POTASSIUM REFLEX MAGNESIUM: 3.8 MMOL/L (ref 3.5–5.1)
RBC # BLD: 3.86 M/UL (ref 4–5.2)
SODIUM BLD-SCNC: 140 MMOL/L (ref 136–145)
WBC # BLD: 6.5 K/UL (ref 4–11)

## 2022-11-24 PROCEDURE — 36415 COLL VENOUS BLD VENIPUNCTURE: CPT

## 2022-11-24 PROCEDURE — 99222 1ST HOSP IP/OBS MODERATE 55: CPT | Performed by: THORACIC SURGERY (CARDIOTHORACIC VASCULAR SURGERY)

## 2022-11-24 PROCEDURE — 80048 BASIC METABOLIC PNL TOTAL CA: CPT

## 2022-11-24 PROCEDURE — 6370000000 HC RX 637 (ALT 250 FOR IP): Performed by: INTERNAL MEDICINE

## 2022-11-24 PROCEDURE — 85025 COMPLETE CBC W/AUTO DIFF WBC: CPT

## 2022-11-24 PROCEDURE — 6370000000 HC RX 637 (ALT 250 FOR IP)

## 2022-11-24 PROCEDURE — 2580000003 HC RX 258

## 2022-11-24 RX ORDER — ONDANSETRON 2 MG/ML
4 INJECTION INTRAMUSCULAR; INTRAVENOUS EVERY 4 HOURS PRN
Status: DISCONTINUED | OUTPATIENT
Start: 2022-11-24 | End: 2022-11-24 | Stop reason: HOSPADM

## 2022-11-24 RX ORDER — SODIUM CHLORIDE 9 MG/ML
INJECTION, SOLUTION INTRAVENOUS CONTINUOUS
Status: DISCONTINUED | OUTPATIENT
Start: 2022-11-24 | End: 2022-11-24 | Stop reason: HOSPADM

## 2022-11-24 RX ORDER — DEXTROSE MONOHYDRATE 100 MG/ML
INJECTION, SOLUTION INTRAVENOUS CONTINUOUS PRN
Status: DISCONTINUED | OUTPATIENT
Start: 2022-11-24 | End: 2022-11-24 | Stop reason: HOSPADM

## 2022-11-24 RX ORDER — INSULIN GLARGINE 100 [IU]/ML
12 INJECTION, SOLUTION SUBCUTANEOUS NIGHTLY
Status: DISCONTINUED | OUTPATIENT
Start: 2022-11-24 | End: 2022-11-24 | Stop reason: HOSPADM

## 2022-11-24 RX ORDER — ATORVASTATIN CALCIUM 40 MG/1
40 TABLET, FILM COATED ORAL NIGHTLY
Status: DISCONTINUED | OUTPATIENT
Start: 2022-11-24 | End: 2022-11-24 | Stop reason: HOSPADM

## 2022-11-24 RX ORDER — POLYETHYLENE GLYCOL 3350 17 G/17G
17 POWDER, FOR SOLUTION ORAL DAILY PRN
Status: DISCONTINUED | OUTPATIENT
Start: 2022-11-24 | End: 2022-11-24 | Stop reason: HOSPADM

## 2022-11-24 RX ORDER — CLOPIDOGREL BISULFATE 75 MG/1
75 TABLET ORAL DAILY
Status: DISCONTINUED | OUTPATIENT
Start: 2022-11-24 | End: 2022-11-24 | Stop reason: HOSPADM

## 2022-11-24 RX ORDER — CLINDAMYCIN HYDROCHLORIDE 150 MG/1
300 CAPSULE ORAL 4 TIMES DAILY
Status: DISCONTINUED | OUTPATIENT
Start: 2022-11-24 | End: 2022-11-24 | Stop reason: HOSPADM

## 2022-11-24 RX ORDER — INSULIN LISPRO 100 [IU]/ML
0-4 INJECTION, SOLUTION INTRAVENOUS; SUBCUTANEOUS NIGHTLY
Status: DISCONTINUED | OUTPATIENT
Start: 2022-11-24 | End: 2022-11-24 | Stop reason: HOSPADM

## 2022-11-24 RX ORDER — SODIUM CHLORIDE 0.9 % (FLUSH) 0.9 %
10 SYRINGE (ML) INJECTION PRN
Status: DISCONTINUED | OUTPATIENT
Start: 2022-11-24 | End: 2022-11-24 | Stop reason: HOSPADM

## 2022-11-24 RX ORDER — ASPIRIN 81 MG/1
81 TABLET ORAL DAILY
Status: DISCONTINUED | OUTPATIENT
Start: 2022-11-24 | End: 2022-11-24 | Stop reason: HOSPADM

## 2022-11-24 RX ORDER — SODIUM CHLORIDE 9 MG/ML
INJECTION, SOLUTION INTRAVENOUS PRN
Status: DISCONTINUED | OUTPATIENT
Start: 2022-11-24 | End: 2022-11-24 | Stop reason: HOSPADM

## 2022-11-24 RX ORDER — CARVEDILOL 6.25 MG/1
6.25 TABLET ORAL 2 TIMES DAILY WITH MEALS
Status: DISCONTINUED | OUTPATIENT
Start: 2022-11-24 | End: 2022-11-24 | Stop reason: HOSPADM

## 2022-11-24 RX ORDER — ACETAMINOPHEN 650 MG/1
650 SUPPOSITORY RECTAL EVERY 4 HOURS PRN
Status: DISCONTINUED | OUTPATIENT
Start: 2022-11-24 | End: 2022-11-24 | Stop reason: HOSPADM

## 2022-11-24 RX ORDER — SENNA PLUS 8.6 MG/1
1 TABLET ORAL DAILY
Status: DISCONTINUED | OUTPATIENT
Start: 2022-11-24 | End: 2022-11-24 | Stop reason: HOSPADM

## 2022-11-24 RX ORDER — SODIUM CHLORIDE 0.9 % (FLUSH) 0.9 %
10 SYRINGE (ML) INJECTION EVERY 12 HOURS SCHEDULED
Status: DISCONTINUED | OUTPATIENT
Start: 2022-11-24 | End: 2022-11-24 | Stop reason: HOSPADM

## 2022-11-24 RX ORDER — ACETAMINOPHEN 325 MG/1
650 TABLET ORAL EVERY 4 HOURS PRN
Status: DISCONTINUED | OUTPATIENT
Start: 2022-11-24 | End: 2022-11-24 | Stop reason: HOSPADM

## 2022-11-24 RX ORDER — INSULIN LISPRO 100 [IU]/ML
0-4 INJECTION, SOLUTION INTRAVENOUS; SUBCUTANEOUS
Status: DISCONTINUED | OUTPATIENT
Start: 2022-11-24 | End: 2022-11-24 | Stop reason: HOSPADM

## 2022-11-24 RX ADMIN — SODIUM CHLORIDE: 9 INJECTION, SOLUTION INTRAVENOUS at 10:13

## 2022-11-24 RX ADMIN — SENNOSIDES 8.6 MG: 8.6 TABLET, COATED ORAL at 13:41

## 2022-11-24 RX ADMIN — CLINDAMYCIN HYDROCHLORIDE 300 MG: 150 CAPSULE ORAL at 08:38

## 2022-11-24 RX ADMIN — CLINDAMYCIN HYDROCHLORIDE 300 MG: 150 CAPSULE ORAL at 13:37

## 2022-11-24 NOTE — CARE COORDINATION
[facility] notified of patient's d/c.   Electronically signed by KARLA Clarke on 11/24/2022 at 7:48 AM

## 2022-11-24 NOTE — ED NOTES
Report called to [facility], all questions addressed. Pt aware of admission status with no questions verbalized. Pt denies any complaints at this time. Pt will be transported to the floor.       Segundo Walker RN  11/24/22 8507

## 2022-11-24 NOTE — CONSULTS
Readmitted last night for bleeding from newly debrided chest wound. Dressing removed. Two skin edge bleeders present, one at 10 o'clock and one at 6 o'clock. Both stopped bleeding with 5 minutes of gentle pressure. Both area cauterized with silver nitrate as belts and suspenders maneuver to prevent further bleeding. Wound repacked with wet to dry dressing. Current dressing can stay on until her vac dressing can be reapplied. Okay for her to go home today from my standpoint. I asked for her to go home with a few silver nitrate sticks just in case other bleeding gets stirred up with a dressing change. I'd hold her coreg until Monday. All other meds can be continued as before admission.

## 2022-11-24 NOTE — ED PROVIDER NOTES
[facility]  ED  EMERGENCY DEPARTMENT ENCOUNTER        Pt Name: Beverley Rizvi  MRN: [id number]  [place] 1970  Date of evaluation: 11/23/2022  Provider: Luann Malik PA-C  PCP: Joshua Cancino DO  Note Started: 10:58 PM EST 11/23/22           I have seen and evaluated this patient with my supervising physician Setpan Davis MD.    [address] [place] [address]       Chief Complaint   Patient presents with    Wound Check     4 way bypass 10/10. Had debridement of lower incision site this morning, started on wound vac. Has been bleeding from site through out the day. Takes Plavix and Aspirin, saturating pads at home. HISTORY OF PRESENT ILLNESS   (Location, Timing/Onset, Context/Setting, Quality, Duration, Modifying Factors, Severity, Associated Signs and Symptoms)  Note limiting factors. Chief Complaint: Bleeding from surgical site    Beverley Rizvi is a 47 y.o. female who presents to the emergency department with . Patient referred to ED by Ester ALBA with CT surgery Dr. Xuan Choudhury. Earlier today Dr. Natali Gusman performed wound debridement under general anesthesia on this patient. Since the debridement the patient has had concerning amount of bleeding which has since slowed while being here in the ED. I do understand that Ester ALBA has been in contact with Nolvia Olsen RN on-call for the group [facility]. It was recommended that the patient be brought to ED and admitted for CT surgery to have further evaluation in the morning. This patient apparently experiencing chest pain early October. She underwent CABG x4 on10/10/22. This procedure was performed by Dr. Thuy Alcala. Patient has had some difficulty with healing of the inferior most aspect of the sternal wound. She has been seen several times by CT surgery. She has had a wound VAC for a while. This apparently did not do well for unclear reason. She went to wet-to-dry dressings.   The patient went in today and it was decided to proceed with a general anesthetic wound debridement. Nursing Notes were all reviewed and agreed with or any disagreements were addressed in the HPI. REVIEW OF SYSTEMS    (2-9 systems for level 4, 10 or more for level 5)     Review of Systems    Positives and Pertinent negatives as per HPI. Except as noted above in the ROS, all other systems were reviewed and negative.        PAST MEDICAL HISTORY     Past Medical History:   Diagnosis Date    Anemia     Diabetes mellitus ([address]     Hypertension     Thyroid disease     PARTIAL REMOVAL FOR NODULES         SURGICAL HISTORY     Past Surgical History:   Procedure Laterality Date     SECTION      X2    CHOLECYSTECTOMY      CORONARY ARTERY BYPASS GRAFT N/A 10/10/2022    CORONARY ARTERY BYPASS GRAFT TIMES THREE WITH POSSIBLE LEFT ATRIAL APPENDAGE CLIP PLACEMENT, JOHN, TEMPORARY PACING WIRES,  INTERNAL MAMMARY ARTERY AND SAPHNEOUS VEIN GRAFT, EDARTERECTOMY, BILATERAL PECTORALIS NERVE BLOCK FOR PAIN CONTROL performed by Enrique Mayorga MD at [facility] ([address] [place] [address])  2016    total vaginal    STERNUM DEBRIDEMENT N/A 2022    STERNAL WOUND DEBRIDEMENT WITH WOUND VACUUM PLACEMENT performed by Geovanna Coronel MD at [address] 2013    Subtotal thyroidectomy, right thyroid lobe, partial left thyroid lobe         CURRENTMEDICATIONS       Previous Medications    ASPIRIN 81 MG EC TABLET    Take 1 tablet by mouth daily    ATORVASTATIN (LIPITOR) 40 MG TABLET    Take 1 tablet by mouth nightly    CARVEDILOL (COREG) 6.25 MG TABLET    Take 1 tablet by mouth 2 times daily Hold for SBP <100 or HR <65    CLINDAMYCIN (CLEOCIN) 300 MG CAPSULE    Take 1 capsule by mouth 4 times daily for 7 days    CLOPIDOGREL (PLAVIX) 75 MG TABLET    Take 1 tablet by mouth daily    INSULIN ASPART (NOVOLOG) 100 UNIT/ML INJECTION PEN    Inject 4 Units into the skin 3 times daily (before meals) INSULIN GLARGINE (LANTUS;BASAGLAR) 100 UNIT/ML INJECTION PEN    Inject 12 Units into the skin nightly    LISINOPRIL (PRINIVIL;ZESTRIL) 2.5 MG TABLET    Take 1 tablet by mouth daily    OXYCODONE (ROXICODONE) 5 MG IMMEDIATE RELEASE TABLET    Take 1 tablet by mouth every 6 hours as needed for Pain for up to 5 days. Intended supply: 3 days. Take lowest dose possible to manage pain         ALLERGIES     Patient has no known allergies. FAMILYHISTORY       Family History   Problem Relation Age of Onset    Diabetes Father     Cancer Paternal Grandmother         OVARION? SOCIAL HISTORY       Social History     Tobacco Use    Smoking status: Never    Smokeless tobacco: Never   Vaping Use    Vaping Use: Never used   Substance Use Topics    Alcohol use: No    Drug use: No       SCREENINGS    [place] Coma Scale  Eye Opening: Spontaneous  Best Verbal Response: Oriented  Best Motor Response: Obeys commands  Joes[place] Coma Scale Score: 15        PHYSICAL EXAM    (up to 7 for level 4, 8 or more for level 5)     ED Triage Vitals [11/23/22 1935]   BP Temp Temp Source Heart Rate Resp SpO2 Height Weight   (!) 157/102 97.7 °F (36.5 °C) Oral 95 14 100 % 5' 4\" (1.626 m) 256 lb (116.1 kg)       Physical Exam  Vitals and nursing note reviewed. Constitutional:       Appearance: Normal appearance. She is well-developed. She is obese. HENT:      Head: Normocephalic and atraumatic. Right Ear: External ear normal.      Left Ear: External ear normal.   Eyes:      General:         Right eye: No discharge. Left eye: No discharge. Conjunctiva/sclera: Conjunctivae normal.   Cardiovascular:      Rate and Rhythm: Normal rate and regular rhythm. Heart sounds: Normal heart sounds. Pulmonary:      Breath sounds: Normal breath sounds. Comments: Patient with a large dressing in place. The dressing has blood evidence but not seeping beyond the dressing boundaries at this time.   I did not remove the dressing  Chest: Chest wall: Tenderness present. Abdominal:      General: Abdomen is flat. Bowel sounds are normal.      Palpations: Abdomen is soft. Tenderness: There is no abdominal tenderness. Musculoskeletal:         General: Normal range of motion. Cervical back: Normal range of motion and neck supple. Right lower leg: No edema. Left lower leg: No edema. Skin:     General: Skin is warm and dry. Neurological:      General: No focal deficit present. Mental Status: She is alert and oriented to person, place, and time. Mental status is at baseline. Psychiatric:         Mood and Affect: Mood normal.         Behavior: Behavior normal.         Thought Content: Thought content normal.         Judgment: Judgment normal.           DIAGNOSTIC RESULTS   LABS:    Labs Reviewed   BASIC METABOLIC PANEL W/ REFLEX TO MG FOR LOW K - Abnormal; Notable for the following components:       Result Value    Glucose 164 (*)     All other components within normal limits   CBC WITH AUTO DIFFERENTIAL   PROTIME-INR   APTT   TYPE AND SCREEN       When ordered only abnormal lab results are displayed. All other labs were within normal range or not returned as of this dictation. EKG: When ordered, EKG's are interpreted by the Emergency Department Physician in the absence of a cardiologist.  Please see their note for interpretation of EKG. RADIOLOGY:   Non-plain film images such as CT, Ultrasound and MRI are read by the radiologist. Plain radiographic images are visualized and preliminarily interpreted by the ED Provider with the below findings:        Interpretation per the Radiologist below, if available at the time of this note:    XR CHEST (2 VW)   Final Result   Evidence of recent sternal splitting procedure for CABG. No acute cardiopulmonary disease. No results found.         PROCEDURES   Unless otherwise noted below, none     Procedures    CRITICAL CARE TIME       CONSULTS:  IP CONSULT TO HOSPITALIST  IP CONSULT TO CARDIOTHORACIC SURGERY      EMERGENCY DEPARTMENT COURSE and DIFFERENTIAL DIAGNOSIS/MDM:   Vitals:    Vitals:    11/23/22 1935 11/23/22 2140 11/23/22 2220 11/23/22 2336   BP: (!) 157/102      Pulse: 95 88 87 79   Resp: 14 15 25    Temp: 97.7 °F (36.5 °C)      TempSrc: Oral      SpO2: 100% 98% 100% 100%   Weight: 256 lb (116.1 kg)      Height: 5' 4\" (1.626 m)          Patient was given the following medications:  Medications - No data to display      Is this patient to be included in the SEP-1 Core Measure due to severe sepsis or septic shock? No   Exclusion criteria - the patient is NOT to be included for SEP-1 Core Measure due to: Infection is not suspected    Patient coming in a request of CT surgery. Patient having had CABG 10/10/2022. She has had problems healing the most inferior aspect of this chest incision. She had general anesthesia and debridement. Bleeding ensued and she is come back to the hospital for admission per request of CT surgery and they will see her in the morning. This case was reviewed with attending physician who personally evaluated the patient. FINAL IMPRESSION      1. Postoperative surgical complication involving subcutaneous tissue associated with non-dermatologic procedure, unspecified complication    2. S/P CABG x 4          DISPOSITION/PLAN   DISPOSITION Admitted 11/23/2022 11:58:10 PM      PATIENT REFERRED TO:  No follow-up provider specified. DISCHARGE MEDICATIONS:  New Prescriptions    No medications on file       DISCONTINUED MEDICATIONS:  Discontinued Medications    No medications on file              (Please note that portions of this note were completed with a voice recognition program.  Efforts were made to edit the dictations but occasionally words are mis-transcribed. )    Lennox Schumacher PA-C (electronically signed)           Lennox Schumacher PA-C  11/24/22 6875

## 2022-11-24 NOTE — H&P
Hospital Medicine History & Physical      PCP: Caitlyn Mcmanus DO    Date of Admission: 2022    Date of Service: Pt seen/examined on 22 and admitted to inpatient with expected LOS greater than two midnights due to medical therapy. Chief Complaint:  bleeding sternal wound    History Of Present Illness:  47 y.o. female with past medical history significant for hypertension, hyperlipidemia, uncontrolled DM2, morbid obesity, CAD s/p CABG x3, sternal wound dehiscence and I&D. She presented to [facility] with complaint of D-Hist, and bleeding sternal wound. Patient reports undergoing a CABG approximately 6 weeks ago. Began having issues with the incision about 1 week postop. She underwent a sternal wound debridement with wound VAC placement on . Per patient, the [facility] had to be taken down and reapplied prior to discharge due to it saying that it was clotted. She discharged with a new VAC in place and went home to meet with the home health nurse, that nurse then had to take down the wound VAC due to the same issue and place a wet-to-dry dressing. Patient reached out to CT surgery who advised her to come to the emergency department to be evaluated. She denies chest pain, dyspnea, n/v/d/c, dysuria, fever, chills, headache.      Past Medical History:          Diagnosis Date    Anemia     Diabetes mellitus ([address]     Hypertension     Thyroid disease     PARTIAL REMOVAL FOR NODULES       Past Surgical History:          Procedure Laterality Date     SECTION      X2    CHOLECYSTECTOMY      CORONARY ARTERY BYPASS GRAFT N/A 10/10/2022    CORONARY ARTERY BYPASS GRAFT TIMES THREE WITH POSSIBLE LEFT ATRIAL APPENDAGE CLIP PLACEMENT, JOHN, TEMPORARY PACING WIRES,  INTERNAL MAMMARY ARTERY AND SAPHNEOUS VEIN GRAFT, EDARTERECTOMY, BILATERAL PECTORALIS NERVE BLOCK FOR PAIN CONTROL [facility] ([address] [place] [address])  2016    total vaginal STERNUM DEBRIDEMENT N/A 11/23/2022    STERNAL WOUND DEBRIDEMENT WITH WOUND VACUUM PLACEMENT performed by Mary Main MD at [address] [place] [address] Street Bilateral 9/5/2013    Subtotal thyroidectomy, right thyroid lobe, partial left thyroid lobe       Medications Prior to Admission:      Prior to Admission medications    Medication Sig Start Date End Date Taking? Authorizing Provider   oxyCODONE (ROXICODONE) 5 MG immediate release tablet Take 1 tablet by mouth every 6 hours as needed for Pain for up to 5 days. Intended supply: 3 days. Take lowest dose possible to manage pain  Patient not taking: Reported on 11/24/2022 11/23/22 11/28/22  Ricardo Reasons, PA   clindamycin (CLEOCIN) 300 MG capsule Take 1 capsule by mouth 4 times daily for 7 days  Patient not taking: Reported on 11/24/2022 11/23/22 11/30/22  Ricardo Reasons, PA   lisinopril (PRINIVIL;ZESTRIL) 2.5 MG tablet Take 1 tablet by mouth daily  Patient not taking: No sig reported 11/17/22   ANDREWS Jo CNP   aspirin 81 MG EC tablet Take 1 tablet by mouth daily 11/8/22 12/8/22  Ricardo Reasons, PA   atorvastatin (LIPITOR) 40 MG tablet Take 1 tablet by mouth nightly 11/8/22 12/8/22  Ricardo Reasons, PA   clopidogrel (PLAVIX) 75 MG tablet Take 1 tablet by mouth daily 11/8/22 12/8/22  Ricardo Reasons, PA   carvedilol (COREG) 6.25 MG tablet Take 1 tablet by mouth 2 times daily Hold for SBP <100 or HR <65 10/26/22   ANJALI Ambriz   insulin aspart (NOVOLOG) 100 UNIT/ML injection pen Inject 4 Units into the skin 3 times daily (before meals) 10/14/22 11/23/22  ANDREWS Nance CNP   insulin glargine (LANTUS;BASAGLAR) 100 UNIT/ML injection pen Inject 12 Units into the skin nightly 10/14/22 11/23/22  ANDREWS Nance CNP       Allergies:  Patient has no known allergies. Social History:      The patient currently lives with family    TOBACCO:   reports that she has never smoked.  She has never used smokeless tobacco.  ETOH:   reports no history of alcohol use. E-cigarette/Vaping       Questions Responses    E-cigarette/Vaping Use Never User    Start Date     Passive Exposure     Quit Date     Counseling Given     Comments             Family History:      Reviewed and negative in regards to presenting illness/complaint. Problem Relation Age of Onset    Diabetes Father     Cancer Paternal Grandmother         OVARION? REVIEW OF SYSTEMS COMPLETED:   Pertinent positives as noted in the HPI. All other systems reviewed and negative. PHYSICAL EXAM PERFORMED:    BP 85/64   Pulse 96   Temp 97.6 °F (36.4 °C) (Oral)   Resp 16   Ht 5' 4\" (1.626 m)   Wt 256 lb (116.1 kg)   LMP 12/13/2016   SpO2 98%   BMI 43.94 kg/m²     General appearance: Resting in bed, comfortable. No apparent distress, appears stated age and cooperative. HEENT: Normal cephalic, atraumatic without obvious deformity. Pupils equal, round, and reactive to light. Extra ocular muscles intact. Conjunctivae/corneas clear. Neck: Supple, with full range of motion. No jugular venous distention. Trachea midline. Respiratory: Normal respiratory effort. Clear to auscultation, bilaterally without Rales/Wheezes/Rhonchi. Room air  Cardiovascular: Regular rate and rhythm with normal S1/S2 without murmurs, rubs or gallops. Abdomen: Soft, non-tender, non-distended with normal bowel sounds. Musculoskeletal: No clubbing, cyanosis or edema bilaterally. Full range of motion without deformity. Dressing in place over the sternum, noted to be C/D/I at time of exam  Skin: Skin color, texture, turgor normal. No rashes or lesions. Neurologic: Neurovascularly intact without any focal sensory/motor deficits.  Cranial nerves: II-XII intact, grossly non-focal.  Psychiatric: Alert and oriented, thought content appropriate, normal insight  Capillary Refill: Brisk,3 seconds, normal  Peripheral Pulses: +2 palpable, equal bilaterally       Labs:     Recent Labs     11/23/22  2248 11/24/22  0505   WBC 6.9 6.5 HGB 13.0 11.0*   HCT 38.5 33.5*    220     Recent Labs     11/23/22  0629 11/23/22  0715 11/23/22  2248 11/24/22  0506     --  139 140   K 5.8* 4.0 4.2 3.8     --  102 104   CO2 24  --  27 26   BUN 12  --  11 12   CREATININE <0.5*  --  0.6 <0.5*   CALCIUM 9.2  --  8.9 8.5     No results for input(s): AST, ALT, BILIDIR, BILITOT, ALKPHOS in the last 72 hours. Recent Labs     11/23/22 2248   INR 1.10     No results for input(s): Cleatrice Campos in the last 72 hours. Urinalysis:      Lab Results   Component Value Date/Time    NITRU Negative 10/08/2022 04:35 PM    BLOODU Negative 10/08/2022 04:35 PM    SPECGRAV 1.020 10/08/2022 04:35 PM    GLUCOSEU >=1000 10/08/2022 04:35 PM       Radiology:     CXR: I have reviewed the CXR with the following interpretation: sternal wires; clear lung fields    XR CHEST (2 VW)   Final Result   Evidence of recent sternal splitting procedure for CABG. No acute cardiopulmonary disease. Consults:    IP CONSULT TO HOSPITALIST  IP CONSULT TO CARDIOTHORACIC SURGERY    ASSESSMENT & PLAN:    Active Hospital Problems    Diagnosis Date Noted    Wound of sternal region [S21.109A] 11/23/2022     Priority: Medium     Sternal incision wound  -Underwent I&D 11/23, was on plavix and ASA at time of procedure  -Continue previously prescribed abx  -Continue wet-to-dry dressings for now  -Surgical bra requested, patient states this was recommended to her by the CT surgery PA   -CT surgery consulted, appreciate recommendations  -Hgb 13 on admission, currently 11. However, when she discharged 10/14, her hgb was 11  -Monitor    History of CAD, stable  -CABG x3, with ANATOLIY clip about 6 weeks ago  -Continue ASA, statin, beta-blocker    Diabetes type II, uncontrolled  -A1C 13.8 (10/2022)  -Hold home regimen while admitted  -Basal bolus dose and sliding scale.  Monitor and adjust as needed  -Carb control diet    Essential hypertension  -On arrival BP was elevated at 157/102  -She is now hypotensive with SBP in the 80s  -Continue carvedilol  -Monitor    Orthostatic hypotension  -Likely due to reported decreased intake over the past few days  -Pt reports feeling lightheaded and sweaty when standing to return from the bathroom  -Requested LITZY hose to be placed bilaterally  -IVF     Hyperlipidemia, controlled  -LDL 95 (10/2022)  -Continue home dose statin    Morbid obesity  -Body mass index is 43.94 kg/m². Complicating assessment and treatment  -Placing patient at risk for multiple co-morbidities as well as early death and contributing to the patient's presentation  -Counseled on weight loss. DVT Prophylaxis: SCDs  Diet: ADULT DIET; Regular  Code Status: Full Code    PT/OT Eval Status: not indicated at this time    Dispo - 1-2 days, pending course       ANDREWS Berry - CNP    Thank you Leonor Arguelles DO for the opportunity to be involved in this patient's care. If you have any questions or concerns please feel free to contact me at [phone].

## 2022-11-24 NOTE — PROGRESS NOTES
4 Eyes Skin Assessment     NAME:  Satnam Hook  YOB: 1970  MEDICAL RECORD NUMBER:  [id number]    The patient is being assessed for  Admission    I agree that One RN have performed a thorough Head to Toe Skin Assessment on the patient. ALL assessment sites listed below have been assessed. Areas assessed by both nurses:    Head, Face, Ears, Shoulders, Back, Chest, Arms, Elbows, Hands, Sacrum. Buttock, Coccyx, Ischium, and Legs. Feet and Heels        Does the Patient have a Wound? Yes wound(s) were present on assessment.  LDA wound assessment was Initiated and completed by RN       Duglas Prevention initiated by RN: No   Wound Care Orders initiated by RN: No    Pressure Injury (Stage 3,4, Unstageable, DTI, NWPT, and Complex wounds) if present place referral order by RN under : No    New and Established Ostomies, if present place, referral order under : No      Nurse 1 eSignature: Electronically signed by Britany Ball RN on 11/24/22 at 2:51 AM EST    **SHARE this note so that the co-signing nurse is able to place an eSignature**    Nurse 2 eSignature: {Esignature:[id number]}

## 2022-11-24 NOTE — ED PROVIDER NOTES
I independently performed a history and physical on Oral Yoel. All diagnostic, treatment, and disposition decisions were made by myself in conjunction with the advanced practice provider. See LIZZ's note for complete documentation for this encounter. I reviewed pertinent nurse's notes, triage notes, vital signs, past medical history, family and social history, medications, and allergies. Complete review of systems was conducted by the mid-level provider and/or myself. Review of systems is negative except as documented in the history of present illness. EKG: Twelve-lead EKG as read and interpreted by myself shows:    Patient was placed on cardiac and blood pressure monitoring and interpreted by myself as follows:    MDM:    Adult female who comes in for wound evaluation. She is nontoxic-appearing, no pallor, no acute distress, vital signs are stable, the wound is evaluated it is currently dressed with packing. I did not remove internal packing but I do not appreciate any significant bleeding though the packing is soaked. Patient was sent in by her cardiothoracic surgeon for admission. Basic laboratory studies and a chest x-ray were ordered which showed no significant abnormalities. Patient will be admitted to the hospitalist service. He was notified via [employer] I did agree to admit this patient to his service. FINAL IMPRESSION     1. Postoperative surgical complication involving subcutaneous tissue associated with non-dermatologic procedure, unspecified complication    2. S/P CABG x 4         DISPOSITION/FOLLOW-UP PLAN    DISPOSITION Admitted 11/23/2022 11:58:10 PM      No follow-up provider specified. Electronically signed by: Kendra Alegria M.D.   I am the primary physician of record         Fara Davis MD  11/24/22 3239

## 2022-11-24 NOTE — PROGRESS NOTES
Pt a/o. VSS except BP is low. Pt reports getting dizzy while walking to BR. MD aware. Changed wound dsg as it was saturated in blood. Did a wet to dry with abd pad and secured with medipore. Pt denied pain. Bed check in place for pt safety. Pt verbalzied understanding of calling for assistance. Pt stable and denied needs. Held asa, plavix and coreg.

## 2022-11-24 NOTE — ED NOTES
Pt's wound soaking through present dressing, wound redressed, wound still oozing blood.       Rodriguez Dumont RN  11/23/22 6354

## 2022-11-25 LAB — AFB SMEAR: NORMAL

## 2022-11-25 NOTE — DISCHARGE SUMMARY
Hospital Medicine Discharge Summary    Patient ID: Nadia Shore      Patient's PCP: Brisa Busch DO    Admit Date: 11/23/2022     Discharge Date:11/24/2022      Admitting Provider: Young Marshall MD     Discharge Provider: ANDREWS Benitez - CNP     Discharge Diagnoses: Active Hospital Problems    Diagnosis     Wound of sternal region [S21.109A]      Priority: Medium       The patient was seen and examined on day of discharge and this discharge summary is in conjunction with any daily progress note from day of discharge. Hospital Course: 47 y.o. female with past medical history significant for hypertension, hyperlipidemia, uncontrolled DM2, morbid obesity, CAD s/p CABG x3, sternal wound dehiscence and I&D. She presented to [facility] with complaint of D-Hist, and bleeding sternal wound. Patient reports undergoing a CABG approximately 6 weeks ago. Began having issues with the incision about 1 week postop. She underwent a sternal wound debridement with wound VAC placement on 11/23. Per patient, the [facility] had to be taken down and reapplied prior to discharge due to it saying that it was clotted. She discharged with a new VAC in place and went home to meet with the home health nurse, that nurse then had to take down the wound VAC due to the same issue and place a wet-to-dry dressing. Patient reached out to CT surgery who advised her to come to the emergency department to be evaluated. She denies chest pain, dyspnea, n/v/d/c, dysuria, fever, chills, headache. Sternal incision wound. Underwent I&D 11/23, was on plavix and ASA at time of procedure. Continue previously prescribed abx. Continue wet-to-dry dressings for now, discharged with W-D dressing in place, and home health to reapply vac on 11/25. Surgical bra requested, patient states this was recommended to her by the CT surgery PA. CT surgery was consulted. Hgb 13 on admission, currently 11. History of CAD, stable.  CABG x3, with ANATOLIY clip about 6 weeks ago. Continue ASA, statin, beta-blocker     Diabetes type II, uncontrolled. A1C 13.8 (10/2022). Held home regimen while admitted, resume on discharge. During admission, basal bolus dose and sliding scale insulin were used. Recommend carb control diet, and close follow up with PCP for further management     Essential hypertension. On arrival BP was elevated at 157/102. She is now hypotensive with SBP in the 80s. May resume carvedilol 11/28 per CT surgery. Recommend monitoring BP at home, and keeping a log to further discuss treatment with PCP     Orthostatic hypotension, resolved. Likely due to reported decreased intake over the past few days. Pt reports feeling lightheaded and sweaty when standing to return from the bathroom. Requested LITZY hose to be placed bilaterally. Resolved with IVF     Hyperlipidemia, controlled. LDL 95 (10/2022). Continued home dose statin     Morbid obesity. Body mass index is 43.94 kg/m². Complicating assessment and treatment. Placing patient at risk for multiple co-morbidities as well as early death and contributing to the patient's presentation. Counseled on weight loss. Physical Exam Performed:     /70   Pulse (!) 114   Temp 97.4 °F (36.3 °C) (Oral)   Resp 16   Ht 5' 4\" (1.626 m)   Wt 256 lb (116.1 kg)   LMP 12/13/2016   SpO2 96%   BMI 43.94 kg/m²       General appearance: No apparent distress, appears stated age and cooperative. HEENT: Normal cephalic, atraumatic without obvious deformity. Pupils equal, round, and reactive to light. Extra ocular muscles intact. Conjunctivae/corneas clear. Neck: Supple, with full range of motion. No jugular venous distention. Trachea midline. Respiratory: Normal respiratory effort. Clear to auscultation, bilaterally without Rales/Wheezes/Rhonchi. Room air  Cardiovascular: Regular rate and rhythm with normal S1/S2 without murmurs, rubs or gallops.   Abdomen: Soft, non-tender, non-distended with normal bowel sounds. Musculoskeletal: No clubbing, cyanosis or edema bilaterally. Full range of motion without deformity. Skin: Skin color, texture, turgor normal. No rashes or lesions. Neurologic: Neurovascularly intact without any focal sensory/motor deficits. Cranial nerves: II-XII intact, grossly non-focal.  Psychiatric: Alert and oriented, thought content appropriate, normal insight  Capillary Refill: Brisk,< 3 seconds   Peripheral Pulses: +2 palpable, equal bilaterally       Labs: For convenience and continuity at follow-up the following most recent labs are provided:      CBC:    Lab Results   Component Value Date/Time    WBC 6.5 11/24/2022 05:05 AM    HGB 11.0 11/24/2022 05:05 AM    HCT 33.5 11/24/2022 05:05 AM     11/24/2022 05:05 AM       Renal:    Lab Results   Component Value Date/Time     11/24/2022 05:06 AM    K 3.8 11/24/2022 05:06 AM     11/24/2022 05:06 AM    CO2 26 11/24/2022 05:06 AM    BUN 12 11/24/2022 05:06 AM    CREATININE <0.5 11/24/2022 05:06 AM    CALCIUM 8.5 11/24/2022 05:06 AM         Significant Diagnostic Studies    Radiology:   XR CHEST (2 VW)   Final Result   Evidence of recent sternal splitting procedure for CABG. No acute cardiopulmonary disease. Consults:     IP CONSULT TO HOSPITALIST  IP CONSULT TO CARDIOTHORACIC SURGERY    Disposition: home     Condition at Discharge: Stable    Discharge Instructions/Follow-up:  Follow up with PCP in 7-10 days.      Code Status:Prior     Activity: activity as tolerated    Diet: diabetic diet      Discharge Medications:     Discharge Medication List as of 11/24/2022  3:09 PM             Details   clindamycin (CLEOCIN) 300 MG capsule Take 1 capsule by mouth 4 times daily for 7 days, Disp-28 capsule, R-0Normal      aspirin 81 MG EC tablet Take 1 tablet by mouth daily, Disp-30 tablet, R-0Normal      atorvastatin (LIPITOR) 40 MG tablet Take 1 tablet by mouth nightly, Disp-30 tablet, R-0Normal      clopidogrel (PLAVIX) 75 MG tablet Take 1 tablet by mouth daily, Disp-30 tablet, R-0Normal      carvedilol (COREG) 6.25 MG tablet Take 1 tablet by mouth 2 times daily Hold for SBP <100 or HR <65, Disp-60 tablet, R-1Normal      insulin aspart (NOVOLOG) 100 UNIT/ML injection pen Inject 4 Units into the skin 3 times daily (before meals), Disp-5 Adjustable Dose Pre-filled Pen Syringe, R-0Print      insulin glargine (LANTUS;BASAGLAR) 100 UNIT/ML injection pen Inject 12 Units into the skin nightly, Disp-5 Adjustable Dose Pre-filled Pen Syringe, R-0Print             Time Spent on discharge: 39 minutes in the examination, evaluation, counseling and review of medications and discharge plan. Signed:    ANDREWS Amor - CNP   11/25/2022      Thank you Caitlyn Mcmanus DO for the opportunity to be involved in this patient's care. If you have any questions or concerns, please feel free to contact me at [phone].

## 2022-11-28 LAB
ANAEROBIC CULTURE: ABNORMAL
CULTURE SURGICAL: ABNORMAL
GRAM STAIN RESULT: ABNORMAL
ORGANISM: ABNORMAL

## 2022-11-29 ENCOUNTER — OFFICE VISIT (OUTPATIENT)
Dept: CARDIOTHORACIC SURGERY | Age: 52
End: 2022-11-29

## 2022-11-29 VITALS
HEIGHT: 64 IN | DIASTOLIC BLOOD PRESSURE: 72 MMHG | OXYGEN SATURATION: 95 % | HEART RATE: 90 BPM | WEIGHT: 255.4 LBS | SYSTOLIC BLOOD PRESSURE: 119 MMHG | BODY MASS INDEX: 43.6 KG/M2 | TEMPERATURE: 97.2 F

## 2022-11-29 DIAGNOSIS — T81.31XS SURGICAL WOUND BREAKDOWN, SEQUELA: Primary | ICD-10-CM

## 2022-11-29 PROCEDURE — 99024 POSTOP FOLLOW-UP VISIT: CPT

## 2022-11-29 RX ORDER — SULFAMETHOXAZOLE AND TRIMETHOPRIM 800; 160 MG/1; MG/1
1 TABLET ORAL 2 TIMES DAILY
Qty: 14 TABLET | Refills: 0 | Status: SHIPPED | OUTPATIENT
Start: 2022-11-29 | End: 2022-12-06

## 2022-11-29 NOTE — PROGRESS NOTES
Cardiac, Vascular and Thoracic Surgeons  Clinic Note     11/29/2022 10:52 AM  Surgeon:  Gilda Palacios     S/P :  CABg x 3, JONH, endarterectomy, ANATOLIY clip, & bilateral PB  on 10/10/22 w/ MOM. Chief complaint : Wound vac check   Subjective:  Ms. Cristofer Clark is doing good. Wound vac still intact and functioning. Serosanguineous fluid in drainage tubing. Pain is well managed. No SOB or swelling. Cardiology follow up on 12/15/22. Vital Signs: /72 (Site: Left Upper Arm, Position: Sitting)   Pulse 90   Temp 97.2 °F (36.2 °C) (Infrared)   Ht 5' 4\" (1.626 m)   Wt 255 lb 6.4 oz (115.8 kg)   LMP 12/13/2016   SpO2 95%   BMI 43.84 kg/m²      I/O:  No intake or output data in the 24 hours ending 11/29/22 1052    Exam:   Cardiovascular: Clear S1, S2. No MRG. Pulmonary: CTAB    Lower extremity: no peripheral edema  Incision: Wound granulating nicely, without surrounding purulent tissue. Tissue pink. Labs:   CBC: No results for input(s): WBC, HGB, HCT, MCV, PLT in the last 72 hours. BMP: No results for input(s): NA, K, CL, CO2, PHOS, BUN, CREATININE, CA in the last 72 hours. PT/INR: No results for input(s): PROTIME, INR in the last 72 hours. APTT: No results for input(s): APTT in the last 72 hours. Scheduled Meds:     Patient Active Problem List   Diagnosis    Coronary artery disease involving native coronary artery of native heart with unstable angina pectoris (HCC)    Ischemic cardiomyopathy    CAD, multiple vessel    Surgical wound breakdown, sequela    Disruption of internal surgical wound    Wound of sternal region       Assessment/Plan:   Wound vac change; s/p CABG x 3, JOHN, endarterectomy, ANATOLIY clip, & bilateral PB  on 10/10/22 w/ MOM  - Wound healing well. Afebrile, no drainage.   - Wound vac changed without difficulty. - Cont 3 x weekly wound vac changes  - Wound culture + E. Coli, sensitive to Bactrim. Rx Bactrim x 7 days.    - F/u PCP as scheduled  - F/u with me in 1 week, or sooner if needed    SANTIAGO Keene PA-C

## 2022-11-30 ENCOUNTER — TELEPHONE (OUTPATIENT)
Dept: CARDIOTHORACIC SURGERY | Age: 52
End: 2022-11-30

## 2022-11-30 NOTE — TELEPHONE ENCOUNTER
Clarified antibiotic plan for Ms. Medrano. She does have probably a couple days left of clindamycin. Per Dary Leiva., CVTS PA, patient can stop clindamycin and begin bactrim today. Patient verbalized understanding.

## 2022-12-01 NOTE — DISCHARGE SUMMARY
Cardiac, Vascular & Thoracic Surgery  Discharge Summary    Patient:  Stephanie Reynoso 1970 [id number]   Admission Date:  11/23/2022  5:36 AM  Discharge Date:  11/23/2022    Principle Diagnosis:  Surgical wound breakdown, sequela    Secondary Diagnosis:  Principal Problem:    Surgical wound breakdown, sequela  Active Problems:    Disruption of internal surgical wound  Resolved Problems:    * No resolved hospital problems. *      Cardiac Cath: 10/7/22  FINDINGS      LVGRAM   LVEDP 13   GRADIENT ACROSS AORTIC VALVE None   LV FUNCTION EF 30%   WALL MOTION Anteroapical HK   MITRAL REGURGITATION Mild      LEO is widely patent     CORONARY ARTERIES   LM Less than 10% ywpahjhw-hbw-zlhici stenosis            LAD Hegi-lhb-pjkljv serial 99% stenoses. LCX Prox-mid 40% stenosis. Distal 95% stenosis. RCA Dominant, Prox-mid 40% stenosis. Distal 50% stenosis. PDA has prox 40% stenosis. PLV has prox-mid 80-95% stenosis. There are R-L collaterals      TTE: 10/7/22  Summary   The left ventricular systolic function is moderately reduced with an   ejection fraction of 35- 40 %. There is hypokinesis of the anterior, apex, anteroseptum and apical anterior   walls. Normal left ventricular size with moderate concentric left ventricular   hypertrophy. Grade I diastolic dysfunction with normal filling pressure. Mild mitral and tricuspid regurgitation. Systolic pulmonic artery pressure (SPAP) is estimated at 41 mmHg consistent   with mild pulmonary hypertension (Right atrial pressure of 8 mmHg).      Procedure:  10/10/22 CORONARY ARTERY BYPASS GRAFT TIMES THREE WITH POSSIBLE LEFT ATRIAL APPENDAGE CLIP PLACEMENT, JOHN, TEMPORARY PACING WIRES,  INTERNAL MAMMARY ARTERY AND SAPHNEOUS VEIN GRAFT, EDARTERECTOMY, BILATERAL PECTORALIS NERVE BLOCK FOR PAIN CONTROL  LIMA to LAD incision was extended through the sternal plaque and LIMA was used over the side of stenosis  OM  PDA    11/23/22 STERNAL WOUND DEBRIDEMENT WITH WOUND VACUUM PLACEMENT     History:  The patient is a 47 y.o. female with significant past medical history of anemia, diabetes (poorly controlled 2/2 compliance), and HTN  who has been having chest tightness for the past few months. Her chest tightness worsened to being constant which inspired her to go to the ED to get it checked out. Cardiology was consulted. During her work up she was found to have ACS/Non-STEMI and was transferred to [facility] to have a cardiac catheterization. Her cath revealed multivessel CAD. Patient underwent CABG surgery 85/16, which was complicated by need for a sternal wound debridement. Hospital Course: The patient underwent CABG x3 with left atrial appendage clip on 10/10. She followed typical postoperative course and progressed very well. Her pain was controlled. She was participating with PT/OT and ambulating without difficulty. She was tolerating p.o. intake. In regards to her diabetes and glucose control, she was instructed to follow-up closely with her PCP preferably within the next 1 to 2 weeks. She was given a refill on her home insulin at the time of discharge. The patient was discharged on 10/14/22 with [facility], home PT/OT. Patient was readmitted 11/23 for outpatient sternal wound debridement which was complicated by bleeding, needing inpatient admission and cauterization of apparent bleeding vessels. She was then discharged 11/23 without issues.      Discharged Condition: stable    Disposition:  home    Discharge Medications:     Medication List        CONTINUE taking these medications      aspirin 81 MG EC tablet  Take 1 tablet by mouth daily     atorvastatin 40 MG tablet  Commonly known as: LIPITOR  Take 1 tablet by mouth nightly     carvedilol 6.25 MG tablet  Commonly known as: Coreg  Take 1 tablet by mouth 2 times daily Hold for SBP <100 or HR <65     clopidogrel 75 MG tablet  Commonly known as: PLAVIX  Take 1 tablet by mouth daily     insulin aspart 100 UNIT/ML injection pen  Commonly known as: NovoLOG  Inject 4 Units into the skin 3 times daily (before meals)     insulin glargine 100 UNIT/ML injection pen  Commonly known as: LANTUS;BASAGLAR  Inject 12 Units into the skin nightly            ASK your doctor about these medications      clindamycin 300 MG capsule  Commonly known as: CLEOCIN  Take 1 capsule by mouth 4 times daily for 7 days  Ask about: Should I take this medication?     lisinopril 2.5 MG tablet  Commonly known as: PRINIVIL;ZESTRIL  Take 1 tablet by mouth daily               Where to Get Your Medications        These medications were sent to [address] [place] [address], [address] [place] [address]      Phone: [phone]   clindamycin 300 MG capsule          Patient Instructions: Activity: DO NOT LIFT, PUSH, OR PULL ANYTHING OVER 5 POUNDS FOR 6 WEEK from the day of surgery  Diet:  cardiac diet  Wound Care:  WOUND VAC CHANGES 3 X WEEKLY; [address] SUPERIOR PORTION OF MIDSTERNAL INCISIONS DAILY WITH A CLEAN WASHCLOTH AND ANTIBACTERIAL SOAP. Do not wash your incisions after you have cleansed other parts of your body    Follow up with Cardiothoracic Surgery PA, next Tuesday for wound vac change. Follow up with Cardiologist as scheduled.     Lali Olivo PA-C

## 2022-12-06 ENCOUNTER — OFFICE VISIT (OUTPATIENT)
Dept: CARDIOTHORACIC SURGERY | Age: 52
End: 2022-12-06

## 2022-12-06 VITALS
HEART RATE: 86 BPM | HEIGHT: 64 IN | BODY MASS INDEX: 43.36 KG/M2 | TEMPERATURE: 97 F | OXYGEN SATURATION: 98 % | WEIGHT: 254 LBS | DIASTOLIC BLOOD PRESSURE: 70 MMHG | SYSTOLIC BLOOD PRESSURE: 105 MMHG

## 2022-12-06 DIAGNOSIS — T81.31XS SURGICAL WOUND BREAKDOWN, SEQUELA: Primary | ICD-10-CM

## 2022-12-06 PROCEDURE — 99024 POSTOP FOLLOW-UP VISIT: CPT | Performed by: THORACIC SURGERY (CARDIOTHORACIC VASCULAR SURGERY)

## 2022-12-06 NOTE — PROGRESS NOTES
Cardiac, Vascular and Thoracic Surgeons  Clinic Note     12/6/2022 2:29 PM  Surgeon:  Gloria Martinez     S/P :  *Wound vac in place - 30 min appt* S/p post-op s/p CABG x3, JOHN, edarterectomy, ANATOLIY clip, & bilateral PB on 10/10/22 w/ MOM. Chief complaint : wound vac change  Subjective:  Ms. Lorren Dakins is doing well. She is here today for wound vac change. Denies pain today. Finished bactrim antibiotic this morning. Vital Signs: /70 (Site: Right Upper Arm, Position: Sitting, Cuff Size: Medium Adult)   Pulse 86   Temp 97 °F (36.1 °C) (Temporal)   Ht 5' 4\" (1.626 m)   Wt 254 lb (115.2 kg)   LMP 12/13/2016   SpO2 98%   BMI 43.60 kg/m²      I/O:  No intake or output data in the 24 hours ending 12/06/22 1429    Exam:   Cardiovascular: S1 plus S2 +0 no additional sound  Pulmonary: Bilaterally clear no additional sound      Incision: Good granulation tissue    Chest X-Ray:  normal     Labs:   CBC: No results for input(s): WBC, HGB, HCT, MCV, PLT in the last 72 hours. BMP: No results for input(s): NA, K, CL, CO2, PHOS, BUN, CREATININE, CA in the last 72 hours. PT/INR: No results for input(s): PROTIME, INR in the last 72 hours. APTT: No results for input(s): APTT in the last 72 hours.     Scheduled Meds:     Patient Active Problem List   Diagnosis    Coronary artery disease involving native coronary artery of native heart with unstable angina pectoris (HCC)    Ischemic cardiomyopathy    CAD, multiple vessel    Surgical wound breakdown, sequela    Disruption of internal surgical wound    Wound of sternal region       Assessment/Plan:   Good granulation tissue was probably by the next clinic we will transition to wet-to-dry    Jazzy Moe MD FACS

## 2022-12-13 ENCOUNTER — OFFICE VISIT (OUTPATIENT)
Dept: CARDIOTHORACIC SURGERY | Age: 52
End: 2022-12-13

## 2022-12-13 VITALS
DIASTOLIC BLOOD PRESSURE: 70 MMHG | OXYGEN SATURATION: 99 % | BODY MASS INDEX: 44.05 KG/M2 | HEART RATE: 75 BPM | HEIGHT: 64 IN | TEMPERATURE: 97.2 F | SYSTOLIC BLOOD PRESSURE: 100 MMHG | WEIGHT: 258 LBS

## 2022-12-13 DIAGNOSIS — Z95.1 S/P CABG (CORONARY ARTERY BYPASS GRAFT): Primary | ICD-10-CM

## 2022-12-13 PROCEDURE — 99024 POSTOP FOLLOW-UP VISIT: CPT

## 2022-12-13 RX ORDER — CLOPIDOGREL BISULFATE 75 MG/1
75 TABLET ORAL DAILY
Qty: 30 TABLET | Refills: 0 | Status: SHIPPED | OUTPATIENT
Start: 2022-12-13 | End: 2023-01-12

## 2022-12-13 RX ORDER — LISINOPRIL 5 MG/1
2.5 TABLET ORAL DAILY
Qty: 30 TABLET | Refills: 0 | Status: SHIPPED | OUTPATIENT
Start: 2022-12-13 | End: 2022-12-15

## 2022-12-13 RX ORDER — ATORVASTATIN CALCIUM 40 MG/1
40 TABLET, FILM COATED ORAL NIGHTLY
Qty: 30 TABLET | Refills: 0 | Status: SHIPPED | OUTPATIENT
Start: 2022-12-13 | End: 2023-01-12

## 2022-12-13 RX ORDER — ASPIRIN 81 MG/1
81 TABLET ORAL DAILY
Qty: 30 TABLET | Refills: 0 | Status: SHIPPED | OUTPATIENT
Start: 2022-12-13 | End: 2023-01-12

## 2022-12-13 NOTE — PROGRESS NOTES
Cardiac, Vascular and Thoracic Surgeons  Clinic Note     12/13/2022 1:31 PM  Surgeon:  Mely Jackson     S/P :  Wound vac in place, s/p CABG x3, JOHN, edarterectomy, ANATOLIY clip, bilit PB on 10/10/22    Chief complaint : wound vac check  Subjective:  Ms. Lizeth Duarte is doing well. Anxious to hear how her wound looks. Vital Signs: /70 (Site: Left Upper Arm, Position: Sitting)   Pulse 75   Temp 97.2 °F (36.2 °C) (Infrared)   Ht 5' 4\" (1.626 m)   Wt 258 lb (117 kg)   LMP 12/13/2016   SpO2 99%   BMI 44.29 kg/m²      I/O:  No intake or output data in the 24 hours ending 12/13/22 1331    Exam:   Incision: granulating nicely, without purulent tissue. 7cm l x 3.5 cm w x 0.75 cm d    Labs:   CBC: No results for input(s): WBC, HGB, HCT, MCV, PLT in the last 72 hours. BMP: No results for input(s): NA, K, CL, CO2, PHOS, BUN, CREATININE, CA in the last 72 hours. PT/INR: No results for input(s): PROTIME, INR in the last 72 hours. APTT: No results for input(s): APTT in the last 72 hours. Scheduled Meds:     Patient Active Problem List   Diagnosis    Coronary artery disease involving native coronary artery of native heart with unstable angina pectoris (HCC)    Ischemic cardiomyopathy    CAD, multiple vessel    Surgical wound breakdown, sequela    Disruption of internal surgical wound    Wound of sternal region       Assessment/Plan:   Wound vac in place, s/p CABG x3, JOHN, edarterectomy, ANATOLIY clip, bilit PB on 10/10/22  -  Wound vac changed without difficulty, tissue granulating well.   - Cont wound vac changes 3x weekly  - F/u in one week, or sooner if needed    SANTIAGO Keene PA-C

## 2022-12-15 ENCOUNTER — OFFICE VISIT (OUTPATIENT)
Dept: CARDIOLOGY CLINIC | Age: 52
End: 2022-12-15

## 2022-12-15 VITALS
WEIGHT: 252 LBS | BODY MASS INDEX: 43.02 KG/M2 | HEIGHT: 64 IN | TEMPERATURE: 98.6 F | HEART RATE: 95 BPM | SYSTOLIC BLOOD PRESSURE: 118 MMHG | OXYGEN SATURATION: 95 % | DIASTOLIC BLOOD PRESSURE: 72 MMHG

## 2022-12-15 DIAGNOSIS — I25.10 CORONARY ARTERY DISEASE INVOLVING NATIVE CORONARY ARTERY OF NATIVE HEART WITHOUT ANGINA PECTORIS: ICD-10-CM

## 2022-12-15 DIAGNOSIS — I25.5 ISCHEMIC CARDIOMYOPATHY: Primary | ICD-10-CM

## 2022-12-15 RX ORDER — LISINOPRIL 5 MG/1
5 TABLET ORAL DAILY
Qty: 30 TABLET | Refills: 0
Start: 2022-12-15

## 2022-12-15 ASSESSMENT — ENCOUNTER SYMPTOMS: SHORTNESS OF BREATH: 0

## 2022-12-15 NOTE — PATIENT INSTRUCTIONS
Everything looks great today, good job!   Ultrasound beginning of February, call [phone]  Obtain fasting labs, do not eat or drink 8 hours prior, water and black coffee ok; day of ultrasound  Check dose of lisinopril; ideally want 5 mg  Continue current medications  Follow up with Deborah and Dr. Mack Little

## 2022-12-15 NOTE — PROGRESS NOTES
[address]   Cardiology Note              Date:  December 15, 2022  Patientname: Blanche Izquierdo  YOB: 1970    Primary Care physician: Leoncio Diana DO    HISTORY OF PRESENT ILLNESS: Blanche Izquierdo is a 47 y.o. female with a history of CAD, ischemic cardiomyopathy, HTN, HLD, DM. She presented 10/2022 for chest pain and found to have NSTEMI. LHC showed severe CAD and referred for CABG. Echo showed EF 35-40%. On 10/10/2022 she had CABG x3, ANATOLIY clip. After discharge she had sternal wound complications, required wound vac and admitted for debridement 2022. Today she presents for follow up for CAD, ischemic cardiomyopathy. She feels good and sternal wound is improving, still has wound vac. She has no shortness of breath, dizziness, syncope, palpitations. She has left chest pain that is not similar to MI symptoms, comes and goes randomly, overall mild. She has made significant diet changes and walks for exercise. 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 She is losing weight. Office weight today 12/15/2022: 252 lbs  Office weight 2022: 254 lbs  Hospital weight 10/13/2022: 264 lbs    Cardiologist: Dr. Altagracia Aldana 10/2022 while hospitalized    Past Medical History:   has a past medical history of Anemia, Diabetes mellitus ([address] Hypertension, and Thyroid disease. Past Surgical History:   has a past surgical history that includes Cholecystectomy;  section; Thyroidectomy (Bilateral, 2013); Hysterectomy (2016); Coronary artery bypass graft (N/A, 10/10/2022); and Sternum Debridement (N/A, 2022). Home Medications:    Prior to Admission medications    Medication Sig Start Date End Date Taking?  Authorizing Provider   aspirin 81 MG EC tablet Take 1 tablet by mouth daily 22  ANJALI Nath   atorvastatin (LIPITOR) 40 MG tablet Take 1 tablet by mouth nightly 22  ANJALI Nath clopidogrel (PLAVIX) 75 MG tablet Take 1 tablet by mouth daily 12/13/22 1/12/23  ANJALI Newell   lisinopril (PRINIVIL;ZESTRIL) 5 MG tablet Take 0.5 tablets by mouth daily 12/13/22   ANJALI Newell   carvedilol (COREG) 6.25 MG tablet Take 1 tablet by mouth 2 times daily Hold for SBP <100 or HR <65 10/26/22   ANJALI Ambriz   insulin aspart (NOVOLOG) 100 UNIT/ML injection pen Inject 4 Units into the skin 3 times daily (before meals) 10/14/22 12/13/22  ANDREWS Montes CNP   insulin glargine (LANTUS;BASAGLAR) 100 UNIT/ML injection pen Inject 12 Units into the skin nightly 10/14/22 12/13/22  ANDREWS Montes CNP     Allergies:  Patient has no known allergies. Social History:   reports that she has never smoked. She has never used smokeless tobacco. She reports that she does not drink alcohol and does not use drugs. Family History: family history includes Cancer in her paternal grandmother; Diabetes in her father. Review of Systems   Review of Systems   Constitutional: Negative. Respiratory:  Negative for shortness of breath. Cardiovascular:  Positive for chest pain. Neurological: Negative.       OBJECTIVE:    Vital signs:    /72   Pulse 95   Temp 98.6 °F (37 °C)   Ht 5' 4\" (1.626 m)   Wt 252 lb (114.3 kg)   LMP 12/13/2016   SpO2 95%   BMI 43.26 kg/m²      Physical Exam:  Constitutional:  Comfortable and alert, NAD, appears stated age  Eyes: PERRL, sclera nonicteric  Neck:  Supple, no masses, no thyroidmegaly, no JVD  Skin:  Warm and dry; no rash or lesions; +sternal incision and wound vac  Heart:  Regular, normal apex, S1 and S2 normal, no M/G/R  Lungs:  Normal respiratory effort; clear; no wheezing/rhonchi/rales  Abdomen: soft, non tender, + bowel sounds  Extremities: Trace edema  Neuro: alert and oriented, moves legs and arms equally, normal mood and affect    Data Reviewed:      CABG 10/10/2022:  CORONARY ARTERY BYPASS GRAFT TIMES THREE WITH POSSIBLE LEFT ATRIAL APPENDAGE CLIP PLACEMENT, JOHN, TEMPORARY PACING WIRES,  INTERNAL MAMMARY ARTERY AND SAPHNEOUS VEIN GRAFT, EDARTERECTOMY, BILATERAL PECTORALIS NERVE BLOCK FOR PAIN CONTROL  LIMA to LAD incision was extended through the sternal plaque and LIMA was used over the side of stenosis  OM  PDA    Echo 10/2022: The left ventricular systolic function is moderately reduced with an   ejection fraction of 35- 40 %. There is hypokinesis of the anterior, apex, anteroseptum and apical anterior   walls. Normal left ventricular size with moderate concentric left ventricular   hypertrophy. Grade I diastolic dysfunction with normal filling pressure. Mild mitral and tricuspid regurgitation. Systolic pulmonic artery pressure (SPAP) is estimated at 41 mmHg consistent   with mild pulmonary hypertension (Right atrial pressure of 8 mmHg). Coronary angiogram 10/7/2022:  nstemi  PROCEDURES PERFORMED    Left heart catheterization  LVgram  Coronary angiogam  Coronary cath  Monitoring of moderate conscious sedation  LIMA angiogram  PROCEDURE DESCRIPTION   Risks/benefits/alternatives/outcomes were discussed with patient and/or family and informed consent was obtained. Using the [facility] scale, the patient's right radial artery was found to be a level B. Patient was prepped draped in the usual sterile fashion. Local anaesthetic was applied over puncture site. Using a front wall technique, a  4/5 Spanish Terumo sheath was inserted into right radial artery. Verapamil, nitroglycerin, nicardipine were administered through the sheath. There was radial spasm which responded to vasodilators. Heparin was administered. Diagnostic 4fr pigtail, JL3.5, JR4 catheters were used for diagnostic angiograms as 5fr catheters would not advance through arm d/t spasm. At the conclusion of the procedure, a TR band was placed over the puncture site and hemostasis was obtained. There were no immediate complications.  I supervised sedation from 2:30pm to 3pm with versed 3mg/fentanyl 50mcg during the procedure. An independent trained observer pushed meds at my direction. We monitored the patient's level of consciousness and vital signs/physiologic status throughout the procedure duration (see times listed previously). 120cc contrast was utilized. <20cc EBL. FINDINGS    LVGRAM  LVEDP 13   GRADIENT ACROSS AORTIC VALVE None   LV FUNCTION EF 30%   WALL MOTION Anteroapical HK   MITRAL REGURGITATION Mild    LEO is widely patent  CORONARY ARTERIES  LM Less than 10% krgzbnrs-ztz-[id number] stenosis            LAD Vyvr-tst-fowtrg serial 99% stenoses. LCX Prox-mid 40% stenosis. Distal 95% stenosis. RCA Dominant, Prox-mid 40% stenosis. Distal 50% stenosis. PDA has prox 40% stenosis. PLV has prox-mid 80-95% stenosis. There are R-L collaterals     CONCLUSIONS:    MV CAD/ASHD  Refer to CT surgery for consideration of CABG  Can consider high risk MV PCI if not felt to be surgically candidate or per pt preference. Place on heparin/Integrilin drips. Cardiology Labs Reviewed:   CBC: No results for input(s): WBC, HGB, HCT, PLT in the last 72 hours. BMP:No results for input(s): NA, K, CO2, BUN, CREATININE, LABGLOM, GLUCOSE in the last 72 hours. PT/INR: No results for input(s): PROTIME, INR in the last 72 hours. APTT:No results for input(s): APTT in the last 72 hours. FASTING LIPID PANEL:  Lab Results   Component Value Date/Time    HDL 38 10/10/2022 03:24 AM    LDLCALC 95 10/10/2022 03:24 AM    TRIG 122 10/10/2022 03:24 AM     LIVER PROFILE:No results for input(s): AST, ALT, ALB in the last 72 hours. BNP: No results found for: PROBNP    Reviewed all labs and imaging today    Assessment:   CAD: no current angina; s/p CABG x3 and ANATOLIY clip in the setting of NSTEMI 10/10/2022  Ischemic cardiomyopathy: EF 35-40% on echo 10/2022  HTN: controlled  HLD: sub optimal, LDL 90, statin and recheck  DM: hgb a1c 14    Plan:   1.  Continue lisinopril 5 mg daily; if dizziness or recurrent hypotension, will decrease to 2.5 mg daily  2. Hold cardiac rehab until sternal incision healed, currently has wound vac and following with CT surgery  3. Continue aspirin, statin, carvedilol, plavix  4. Aggressive DM management per PCP; at appointment next month she will discuss SGLT2i or GLP1 for weight loss benefit   5. Repeat echo for EF  6. Lipids next month  7.  Follow up 2/2023 with Dr. Damien Mccauley, [address]  [phone]

## 2022-12-20 ENCOUNTER — OFFICE VISIT (OUTPATIENT)
Dept: CARDIOTHORACIC SURGERY | Age: 52
End: 2022-12-20

## 2022-12-20 VITALS
HEIGHT: 64 IN | DIASTOLIC BLOOD PRESSURE: 62 MMHG | HEART RATE: 83 BPM | BODY MASS INDEX: 43.06 KG/M2 | SYSTOLIC BLOOD PRESSURE: 110 MMHG | TEMPERATURE: 97.2 F | OXYGEN SATURATION: 97 % | WEIGHT: 252.2 LBS

## 2022-12-20 DIAGNOSIS — S21.109A WOUND OF STERNAL REGION: Primary | ICD-10-CM

## 2022-12-20 PROCEDURE — 99024 POSTOP FOLLOW-UP VISIT: CPT

## 2022-12-20 NOTE — PROGRESS NOTES
[facility]  Clinic Note     12/20/2022 9:29 AM  Surgeon:  Kelly Mai     S/P :  CABG x3, JOHN, edarterectomy, ANATOLIY clip, & bilateral PB on 10/10/22 w/ MOM    Chief complaint : Wound Vac dressing change   Subjective:  Ms. Robert Alfaro is doing good. Pain is well managed. Denies any SOB or swelling. Wound vac in place still. Cardiology follow up on 2/13/23. Vital Signs: /62 (Site: Left Upper Arm, Position: Sitting)   Pulse 83   Temp 97.2 °F (36.2 °C) (Infrared)   Ht 5' 4\" (1.626 m)   Wt 252 lb 3.2 oz (114.4 kg)   LMP 12/13/2016   SpO2 97%   BMI 43.29 kg/m²      I/O:  No intake or output data in the 24 hours ending 12/20/22 0929    Exam:   Incision: pink tissue, granulating nicely; small area of white tissue in center. Healing very nicely    Labs:   CBC: No results for input(s): WBC, HGB, HCT, MCV, PLT in the last 72 hours. BMP: No results for input(s): NA, K, CL, CO2, PHOS, BUN, CREATININE, CA in the last 72 hours. PT/INR: No results for input(s): PROTIME, INR in the last 72 hours. APTT: No results for input(s): APTT in the last 72 hours.     Scheduled Meds:     Patient Active Problem List   Diagnosis    Coronary artery disease involving native coronary artery of native heart with unstable angina pectoris (HCC)    Ischemic cardiomyopathy    CAD, multiple vessel    Surgical wound breakdown, sequela    Disruption of internal surgical wound    Wound of sternal region       Assessment/Plan:   Would vac change  - Patient doing well, wound vac changed today without difficulty or complications  - Wound healing very nicely  - Cont 3 x weekly wound vac changes  - F/u with me in 1 week    Lali Olivo PA-C

## 2022-12-27 ENCOUNTER — OFFICE VISIT (OUTPATIENT)
Dept: CARDIOTHORACIC SURGERY | Age: 52
End: 2022-12-27

## 2022-12-27 VITALS
HEART RATE: 71 BPM | SYSTOLIC BLOOD PRESSURE: 104 MMHG | HEIGHT: 64 IN | OXYGEN SATURATION: 98 % | BODY MASS INDEX: 44.01 KG/M2 | DIASTOLIC BLOOD PRESSURE: 75 MMHG | WEIGHT: 257.8 LBS | TEMPERATURE: 97.2 F

## 2022-12-27 DIAGNOSIS — S21.109A WOUND OF STERNAL REGION: Primary | ICD-10-CM

## 2022-12-27 PROCEDURE — 99024 POSTOP FOLLOW-UP VISIT: CPT

## 2022-12-27 NOTE — PROGRESS NOTES
[facility]  Clinic Note     12/27/2022 1:38 PM  Surgeon:  Ana Conde     S/P:  CABG x3, JOHN, edarterectomy, ANATOLIY clip, & bilateral PB on 10/10/22 w/ MOM    Chief complaint: Wound vac change  Subjective:  Ms. Avalos Just is doing well. No complaints today. Denies CP, SOB, fever, chills. Vital Signs: /75 (Site: Left Upper Arm, Position: Sitting, Cuff Size: Medium Adult)   Pulse 71   Temp 97.2 °F (36.2 °C) (Temporal)   Ht 5' 4\" (1.626 m)   Wt 257 lb 12.8 oz (116.9 kg)   LMP 12/13/2016   SpO2 98%   BMI 44.25 kg/m²      I/O:  No intake or output data in the 24 hours ending 12/27/22 1338    Exam:   Incision: Wound vac removed, tissue granulating nicely. Pink, without surrounding erythema, or purulence. Labs:   CBC: No results for input(s): WBC, HGB, HCT, MCV, PLT in the last 72 hours. BMP: No results for input(s): NA, K, CL, CO2, PHOS, BUN, CREATININE, CA in the last 72 hours. PT/INR: No results for input(s): PROTIME, INR in the last 72 hours. APTT: No results for input(s): APTT in the last 72 hours. Scheduled Meds:     Patient Active Problem List   Diagnosis    Coronary artery disease involving native coronary artery of native heart with unstable angina pectoris (HCC)    Ischemic cardiomyopathy    CAD, multiple vessel    Surgical wound breakdown, sequela    Disruption of internal surgical wound    Wound of sternal region       Assessment/Plan:   Wound vac changed without difficulty; looks excellent. I am very hopeful this will be last week with wound vac pending continued progress. Cont wound vac changes 3 x weekly, then will go to wet to dry 3 x weekly. She may return to work at that time.      Jean Paul Whitman PA-C

## 2023-01-03 ENCOUNTER — OFFICE VISIT (OUTPATIENT)
Dept: CARDIOTHORACIC SURGERY | Age: 53
End: 2023-01-03

## 2023-01-03 VITALS
HEIGHT: 64 IN | TEMPERATURE: 97.1 F | BODY MASS INDEX: 42.68 KG/M2 | WEIGHT: 250 LBS | DIASTOLIC BLOOD PRESSURE: 62 MMHG | HEART RATE: 83 BPM | SYSTOLIC BLOOD PRESSURE: 100 MMHG | OXYGEN SATURATION: 99 %

## 2023-01-03 DIAGNOSIS — Z95.1 S/P CABG (CORONARY ARTERY BYPASS GRAFT): ICD-10-CM

## 2023-01-03 DIAGNOSIS — T81.31XS SURGICAL WOUND BREAKDOWN, SEQUELA: Primary | ICD-10-CM

## 2023-01-03 PROCEDURE — 99024 POSTOP FOLLOW-UP VISIT: CPT

## 2023-01-03 RX ORDER — CLOPIDOGREL BISULFATE 75 MG/1
75 TABLET ORAL DAILY
Qty: 30 TABLET | Refills: 1 | Status: SHIPPED | OUTPATIENT
Start: 2023-01-03 | End: 2023-02-02

## 2023-01-03 RX ORDER — CARVEDILOL 6.25 MG/1
TABLET ORAL
Qty: 60 TABLET | Refills: 1 | Status: SHIPPED | OUTPATIENT
Start: 2023-01-03

## 2023-01-03 RX ORDER — LISINOPRIL 5 MG/1
5 TABLET ORAL DAILY
Qty: 30 TABLET | Refills: 1 | Status: SHIPPED | OUTPATIENT
Start: 2023-01-03

## 2023-01-03 RX ORDER — ATORVASTATIN CALCIUM 40 MG/1
40 TABLET, FILM COATED ORAL NIGHTLY
Qty: 30 TABLET | Refills: 1 | Status: SHIPPED | OUTPATIENT
Start: 2023-01-03 | End: 2023-02-02

## 2023-01-03 RX ORDER — ASPIRIN 81 MG/1
81 TABLET ORAL DAILY
Qty: 30 TABLET | Refills: 1 | Status: SHIPPED | OUTPATIENT
Start: 2023-01-03 | End: 2023-02-02

## 2023-01-03 NOTE — PROGRESS NOTES
[facility]  Clinic Note     1/3/2023 11:26 AM  Surgeon:  Christian Amador     S/P :  *Wound vac in place - 30 min appt* S/p post-op s/p CABG x3, JOHN, edarterectomy, ANATOLIY clip, & bilateral PB on 10/10/22 w/ MOM. Chief complaint : Wound Vac dressing Change   Subjective:  Ms. Sowmya Acuna is doing well. Denies SOB. Wound vac in place. Denies any pain or swelling. Vital Signs: Pulse 83   Temp 97.1 °F (36.2 °C)   Ht 5' 4\" (1.626 m)   Wt 250 lb (113.4 kg)   LMP 12/13/2016   SpO2 99%   BMI 42.91 kg/m²      I/O:  No intake or output data in the 24 hours ending 01/03/23 1126    Exam:   Incision: granulated nicely, 5cm x 3 cm, x 0.1 cm deep. Labs:   CBC: No results for input(s): WBC, HGB, HCT, MCV, PLT in the last 72 hours. BMP: No results for input(s): NA, K, CL, CO2, PHOS, BUN, CREATININE, CA in the last 72 hours. PT/INR: No results for input(s): PROTIME, INR in the last 72 hours. APTT: No results for input(s): APTT in the last 72 hours.     Scheduled Meds:     Patient Active Problem List   Diagnosis    Coronary artery disease involving native coronary artery of native heart with unstable angina pectoris (HCC)    Ischemic cardiomyopathy    CAD, multiple vessel    Surgical wound breakdown, sequela    Disruption of internal surgical wound    Wound of sternal region       Assessment/Plan:   Wound vac change s/p post-op s/p CABG x3, JOHN, edarterectomy, ANATOLIY clip, & bilateral PB on 10/10/22 w/ MOM  - Wet to dry dressing, to be changed 3 x weekly  - Pt to call office if dressing has purulent drainage, or wound becomes tender  - F/u in 1 week in clinic with renny Starkey PA-C

## 2023-01-03 NOTE — LETTER
[facility] Cardiothoracic Surgery  [address] [address]  Phone: [phone]  Fax: [phone]    Sanya Hein        January 3, 2023     Patient: Alannah Sosa   YOB: 1970   Date of Visit: 1/3/2023       To Whom It May Concern: It is my medical opinion that Narvis Ready may return to full duty immediately with no restrictions. If you have any questions or concerns, please don't hesitate to call.     Sincerely,        Cecile Yoon PA-C

## 2023-01-10 ENCOUNTER — OFFICE VISIT (OUTPATIENT)
Dept: CARDIOTHORACIC SURGERY | Age: 53
End: 2023-01-10

## 2023-01-10 VITALS
DIASTOLIC BLOOD PRESSURE: 60 MMHG | BODY MASS INDEX: 42.85 KG/M2 | WEIGHT: 251 LBS | OXYGEN SATURATION: 99 % | HEIGHT: 64 IN | HEART RATE: 92 BPM | TEMPERATURE: 97.2 F | SYSTOLIC BLOOD PRESSURE: 95 MMHG

## 2023-01-10 DIAGNOSIS — Z95.1 S/P CABG (CORONARY ARTERY BYPASS GRAFT): Primary | ICD-10-CM

## 2023-01-10 DIAGNOSIS — T81.31XS SURGICAL WOUND BREAKDOWN, SEQUELA: ICD-10-CM

## 2023-01-10 PROCEDURE — 99024 POSTOP FOLLOW-UP VISIT: CPT

## 2023-01-10 NOTE — LETTER
[facility] Cardiothoracic Surgery  [address] [address]  Phone: [phone]  Fax: [phone] [place], [place]        January 10, 2023     Patient: Eric Moses   YOB: 1970   Date of Visit: 1/10/2023       To Whom It May Concern: It is my medical opinion that Giana Joseph may return to full duty immediately with no restrictions. She was seen in our office today, so please excuse her for the day. If you have any questions or concerns, please don't hesitate to call.     Sincerely,            Tosin Meadows MD, Shivani Dunbar

## 2023-01-10 NOTE — PROGRESS NOTES
[facility]  Clinic Note     1/10/2023 1:03 PM  Surgeon:  Raul Sotelo     S/P : post-op s/p CABG x3, JOHN, edarterectomy, ANATOLIY clip, & bilateral PB on 10/10/22 w/ MOM. Chief complaint : Post op follow up and dressing change. Subjective:  Ms. Matt Rose is dong great. Denies any pain, SOB or swelling. Doing wet to dry dressing changes Tuesdays Thursdays and Saturdays. Vital Signs: Pulse 92   Temp 97.2 °F (36.2 °C) (Infrared)   Ht 5' 4\" (1.626 m)   Wt 251 lb (113.9 kg)   LMP 12/13/2016   SpO2 99%   BMI 43.08 kg/m²      I/O:  No intake or output data in the 24 hours ending 01/10/23 1303    Exam:   Incision: pink, granulating nicely, without signs of infection    Labs:   CBC: No results for input(s): WBC, HGB, HCT, MCV, PLT in the last 72 hours. BMP: No results for input(s): NA, K, CL, CO2, PHOS, BUN, CREATININE, CA in the last 72 hours. PT/INR: No results for input(s): PROTIME, INR in the last 72 hours. APTT: No results for input(s): APTT in the last 72 hours. Scheduled Meds:     Patient Active Problem List   Diagnosis    Coronary artery disease involving native coronary artery of native heart with unstable angina pectoris (HCC)    Ischemic cardiomyopathy    CAD, multiple vessel    Surgical wound breakdown, sequela    Disruption of internal surgical wound    Wound of sternal region       Assessment/Plan:   Wound dressing changed in clinic, without complications. Change daily x 1 week, follow up with me in 1 week or sooner if needed.       Williams Carney PA-C

## 2023-01-17 ENCOUNTER — OFFICE VISIT (OUTPATIENT)
Dept: CARDIOTHORACIC SURGERY | Age: 53
End: 2023-01-17

## 2023-01-17 VITALS
HEART RATE: 88 BPM | WEIGHT: 255.8 LBS | HEIGHT: 64 IN | TEMPERATURE: 97.2 F | SYSTOLIC BLOOD PRESSURE: 99 MMHG | BODY MASS INDEX: 43.67 KG/M2 | DIASTOLIC BLOOD PRESSURE: 61 MMHG | OXYGEN SATURATION: 98 %

## 2023-01-17 DIAGNOSIS — Z95.1 S/P CABG (CORONARY ARTERY BYPASS GRAFT): Primary | ICD-10-CM

## 2023-01-17 PROCEDURE — 99024 POSTOP FOLLOW-UP VISIT: CPT

## 2023-01-17 NOTE — PROGRESS NOTES
[facility]  Clinic Note     1/17/2023 1:29 PM  Surgeon:  Rylie Johnson     S/P : CABG x3, JOHN, edarterectomy, ANATOLIY clip, & bilateral PB on 10/10/22 w/ MOM. Chief complaint : Wound check   Subjective:  Ms. Nathaly Cordova is doing great. Dressing changes going great. No pain, SOB or swelling. MSI with wet to dry dressing. Cardiology follow up on 2/13/23. Vital Signs: BP 99/61 (Site: Left Upper Arm, Position: Sitting)   Pulse 88   Temp 97.2 °F (36.2 °C) (Infrared)   Ht 5' 4\" (1.626 m)   Wt 255 lb 12.8 oz (116 kg)   LMP 12/13/2016   SpO2 98%   BMI 43.91 kg/m²      I/O:  No intake or output data in the 24 hours ending 01/17/23 1329    Exam:   Incision: granulating very well, no signs of infection    Labs:   CBC: No results for input(s): WBC, HGB, HCT, MCV, PLT in the last 72 hours. BMP: No results for input(s): NA, K, CL, CO2, PHOS, BUN, CREATININE, CA in the last 72 hours. PT/INR: No results for input(s): PROTIME, INR in the last 72 hours. APTT: No results for input(s): APTT in the last 72 hours. Scheduled Meds:     Patient Active Problem List   Diagnosis    Coronary artery disease involving native coronary artery of native heart with unstable angina pectoris (HCC)    Ischemic cardiomyopathy    CAD, multiple vessel    Surgical wound breakdown, sequela    Disruption of internal surgical wound    Wound of sternal region       Assessment/Plan:   Wound check  - Sternal wound looks excellent. No c/o infection. Pt may continue daily dressing changes.   - F/u PRN    Marzena Greene PA-C

## 2023-02-01 ENCOUNTER — TELEPHONE (OUTPATIENT)
Dept: CARDIOLOGY CLINIC | Age: 53
End: 2023-02-01

## 2023-02-01 ENCOUNTER — HOSPITAL ENCOUNTER (OUTPATIENT)
Age: 53
Discharge: HOME | End: 2023-02-01
Payer: COMMERCIAL

## 2023-02-01 ENCOUNTER — HOSPITAL ENCOUNTER (OUTPATIENT)
Dept: NON INVASIVE DIAGNOSTICS | Age: 53
Discharge: HOME | End: 2023-02-01
Payer: COMMERCIAL

## 2023-02-01 DIAGNOSIS — I25.5 ISCHEMIC CARDIOMYOPATHY: ICD-10-CM

## 2023-02-01 LAB
ALBUMIN SERPL-MCNC: 3.9 G/DL (ref 3.4–5)
ALP BLD-CCNC: 83 U/L (ref 40–129)
ALT SERPL-CCNC: 15 U/L (ref 10–40)
ANION GAP SERPL CALCULATED.3IONS-SCNC: 10 MMOL/L (ref 3–16)
AST SERPL-CCNC: 12 U/L (ref 15–37)
BILIRUB SERPL-MCNC: <0.2 MG/DL (ref 0–1)
BILIRUBIN DIRECT: <0.2 MG/DL (ref 0–0.3)
BILIRUBIN, INDIRECT: ABNORMAL MG/DL (ref 0–1)
BUN BLDV-MCNC: 13 MG/DL (ref 7–20)
CALCIUM SERPL-MCNC: 9.4 MG/DL (ref 8.3–10.6)
CHLORIDE BLD-SCNC: 103 MMOL/L (ref 99–110)
CHOLESTEROL, TOTAL: 135 MG/DL (ref 0–199)
CO2: 29 MMOL/L (ref 21–32)
CREAT SERPL-MCNC: <0.5 MG/DL (ref 0.6–1.1)
GFR SERPL CREATININE-BSD FRML MDRD: >60 ML/MIN/{1.73_M2}
GLUCOSE BLD-MCNC: 217 MG/DL (ref 70–99)
HDLC SERPL-MCNC: 54 MG/DL (ref 40–60)
LDL CHOLESTEROL CALCULATED: 65 MG/DL
LV EF: 55 %
LVEF MODALITY: NORMAL
POTASSIUM SERPL-SCNC: 4.9 MMOL/L (ref 3.5–5.1)
SODIUM BLD-SCNC: 142 MMOL/L (ref 136–145)
TOTAL PROTEIN: 6.8 G/DL (ref 6.4–8.2)
TRIGL SERPL-MCNC: 79 MG/DL (ref 0–150)
VLDLC SERPL CALC-MCNC: 16 MG/DL

## 2023-02-01 PROCEDURE — 36415 COLL VENOUS BLD VENIPUNCTURE: CPT

## 2023-02-01 PROCEDURE — 80076 HEPATIC FUNCTION PANEL: CPT

## 2023-02-01 PROCEDURE — 93308 TTE F-UP OR LMTD: CPT

## 2023-02-01 PROCEDURE — 80061 LIPID PANEL: CPT

## 2023-02-01 PROCEDURE — 80048 BASIC METABOLIC PNL TOTAL CA: CPT

## 2023-02-01 NOTE — TELEPHONE ENCOUNTER
----- Message from ANDREWS Roberts CNP sent at 2/1/2023 12:44 PM EST -----  Please let her know that heart function looks significantly better than before, now normal. Great news! Just waiting on blood work to process. Thanks!

## 2023-02-02 ENCOUNTER — TELEPHONE (OUTPATIENT)
Dept: CARDIOLOGY CLINIC | Age: 53
End: 2023-02-02

## 2023-02-02 NOTE — TELEPHONE ENCOUNTER
----- Message from ANDREWS Nicolas CNP sent at 2/2/2023  8:01 AM EST -----  Please let her know that all blood work/cholesterol looked great with the exception of elevated glucose. Follow up with PCP. Overall good news on recent heart testing.  Follow up as planned with Dr. Davina Mills

## 2023-02-10 NOTE — PROGRESS NOTES
[facility]   Cardiac Consultation    Referring Provider:  Arslan Garcia DO     Chief Complaint   Patient presents with    Follow-up     Post CABG *4 Dr. Frandy Tejada Patient    Coronary Artery Disease    Other     Incision site       Subjective: Ms is here today cardiology follow up; c/o incision check     History of Present Illness:   Benjamin Luis is a 47 y.o. female who has PMH HTN, HLD, NSTEMI and CAD s/p 3V CABG and ANATOLIY clip 10/7/22 per Dr. Livan Ball, resolved ICM (prior 35-40% EF now 55%), prior medication noncompliance (before CABG), DM, s/p thyroidectomy. Carotid doppler 10/7/22 <50% diameter reducing  stenosis. The right vertebral artery demonstrates abnormal highly resistive flow, suggesting distal stenosis/occlusion. Most recent [facility] 10/7/22 MV CAD/ASHD in diabetic. Underwent 3V CABG and ANATOLIY clip 10/10/22. LIMA to LAD reverse saphenous vein to OM reverse saphenous vein to PDA. On 11/23/22 he had sternal wound debridement with wound vac placement. ECHO 10/7/22 LVEF=35-40%. moderate cLVH; +WMA; Grade I DD. Mild MR, TR; (SPAP) is estimated at 41 mmHg c/w mild pulm HTN. Most recent limited ECHO 2/1/23 LVEF=55%. Today, she reports she is feeling good. Her home bp log shows good numbers. She has concerns about her incision having a pin hole at the bottom. 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 She had left sided aching/twinges when she first went back to work. She only has them on a rare occasion now. The CP she had prior to surgery has resolved. Patient denies current edema, chest pain, sob, palpitations, dizziness or syncope. Patient is taking all cardiac medications as prescribed and tolerates them well. Weight today is 258#     Patient is vaccinated against Covid.  Pfizer 2/2 Moderna 1/1      Past Medical History:   has a past medical history of Anemia, Diabetes mellitus ([address] Hypertension, and Thyroid disease. Surgical History:   has a past surgical history that includes Cholecystectomy;  section; Thyroidectomy (Bilateral, 2013); Hysterectomy (2016); Coronary artery bypass graft (N/A, 10/10/2022); and Sternum Debridement (N/A, 2022). Social History:   reports that she has never smoked. She has never used smokeless tobacco. She reports that she does not drink alcohol and does not use drugs. Family History:  family history includes Cancer in her paternal grandmother; Diabetes in her father. Home Medications:  Prior to Admission medications    Medication Sig Start Date End Date Taking? Authorizing Provider   lisinopril (PRINIVIL;ZESTRIL) 5 MG tablet Take 1 tablet by mouth daily 1/3/23  Yes ANJALI Masterson   aspirin 81 MG EC tablet Take 1 tablet by mouth daily 1/3/23 2/13/23 Yes ANJALI Ambriz   atorvastatin (LIPITOR) 40 MG tablet Take 1 tablet by mouth nightly 1/3/23 2/13/23 Yes ANJALI Ambriz   clopidogrel (PLAVIX) 75 MG tablet Take 1 tablet by mouth daily 1/3/23 2/13/23 Yes ANJALI Ambriz   carvedilol (COREG) 6.25 MG tablet Take 1 tablet by mouth 2 times daily Hold for SBP <100 or HR <65 1/3/23  Yes ANJALI Ambriz   insulin aspart (NOVOLOG) 100 UNIT/ML injection pen Inject 4 Units into the skin 3 times daily (before meals) 10/14/22 2/13/23 Yes [address] [place] [address]   insulin glargine (LANTUS;BASAGLAR) 100 UNIT/ML injection pen Inject 12 Units into the skin nightly 10/14/22 2/13/23 Yes [address] [place] [address]        Allergies:  Patient has no known allergies. Review of Systems:   Constitutional: there has been no unanticipated weight loss. There's been no change in energy level, sleep pattern, or activity level. Eyes: No visual changes or diplopia. No scleral icterus. ENT: No Headaches, hearing loss or vertigo. No mouth sores or sore throat. Cardiovascular: Reviewed in HPI  Respiratory: No cough or wheezing, no sputum production.  No hematemesis. Gastrointestinal: No abdominal pain, appetite loss, blood in stools. No change in bowel or bladder habits. Genitourinary: No dysuria, trouble voiding, or hematuria. Musculoskeletal:  No gait disturbance, weakness or joint complaints. Integumentary: No rash or pruritis. Neurological: No headache, diplopia, change in muscle strength, numbness or tingling. No change in gait, balance, coordination, mood, affect, memory, mentation, behavior. Psychiatric: No anxiety, no depression. Endocrine: No malaise, fatigue or temperature intolerance. No excessive thirst, fluid intake, or urination. No tremor. Hematologic/Lymphatic: No abnormal bruising or bleeding, blood clots or swollen lymph nodes. Allergic/Immunologic: No nasal congestion or hives. Physical Examination:    Vitals:    02/13/23 1418   BP: 128/80   Pulse: 82   Temp: 98.6 °F (37 °C)   SpO2: 97%   Weight: 258 lb (117 kg)   Height: 5' 4\" (1.626 m)     Constitutional and General Appearance: NAD   Respiratory:  Normal excursion and expansion without use of accessory muscles  Resp Auscultation: Clear, no crackles or wheezes   Cardiovascular: The apical impulses not displaced  Heart tones are crisp and normal  Cervical veins are not engorged  The carotid upstroke is normal in amplitude and contour without delay or bruit  Normal S1S2, No S3, No Murmur  Peripheral pulses are symmetrical and full  There is no clubbing, cyanosis of the extremities. No edema  Femoral Arteries: 2+ and equal  Pedal Pulses: 2+ and equal   Abdomen:  No masses or tenderness  Liver/Spleen: No Abnormalities Noted  Neurological/Psychiatric:  Alert and oriented in all spheres  Moves all extremities well  Exhibits normal gait balance and coordination  No abnormalities of mood, affect, memory, mentation, or behavior are noted  Skin:  Skin: warm and dry.     Lab Results   Component Value Date    CHOL 135 02/01/2023    CHOL 157 10/10/2022     Lab Results   Component Value Date TRIG 79 02/01/2023    TRIG 122 10/10/2022     Lab Results   Component Value Date    HDL 54 02/01/2023    HDL 38 (L) 10/10/2022     Lab Results   Component Value Date    LDLCALC 65 02/01/2023    [facility] 95 10/10/2022     Lab Results   Component Value Date    LABVLDL 16 02/01/2023    LABVLDL 24 10/10/2022     No results found for: CHOLHDLRATIO   Assessment:     1. Coronary artery disease involving native coronary artery of native heart with unstable angina pectoris [facility]):  Underwent 3V CABG and ANATOLIY clip 10/10/22. Doing well clinically and there are no concerning symptoms for angina currently. Continue DAPT for hx NSTEMI until 10/7/23 and then baby aspirin indefinitely. Need referral to cardiac rehab.     2. Ischemic cardiomyopathy: Resolved. Initial EF=35-40% but after surgical revascularization and med mgt her EF has returned to normal 55% on 2/1/23 stud. 3. CAD, multiple vessel: See # 1 above     4. Lipids: I personally reviewed most recent lipids from 2/1/23. Well controlled and at goal and will continue current medical regimen. 5. Wound of sternal region: S/P wound vac prior and incision healing well now. Plan:  Current medications reviewed. Refills given as warranted. Goal BP is less than 130/80, if you are consistently above goal call and let me know so I can adjust your medications  Recommend following up with pcp as scheduled to work on your A1C  I think it is ok to take the bandage off of your incision. 5.   Call Dr. Florian Dowling office to see what he recommends putting on your incision. Also ask his office about your physical restrictions with work. 6.   You will be on Aspirin and Plavix until October 7, 2023. Then on October 8, 2023 you can stop taking plavix. You will be on aspirin for life  7. If you develop a dry cough from the lisinopril call and let me know and I can switch you to a different medication. 8.   Referral given to cardiac rehab at [address] [place] [address].    Your heart strength was 35% before your heart attack. You heart strength was 55% on 2/1/23 which is back to normal.  This is excellent! Follow up with me in 6 months    This note is scribed in the presence of Dr. Neto Macdonald. Rosalina Garcia by Gabi Ram RN.    I, Dr. Verito Alfonso, personally performed the services described in this documentation, as scribed by the above signed scribe in my presence. It is both accurate and complete to my knowledge. I agree with the details independently gathered by the clinical support staff, while the remaining scribed note accurately describes my personal service to the patient.  '  Thank you for allowing me to participate in the care of this individual.    Neto Macdonald.  Rosalina Garcia M.D., [facility]

## 2023-02-13 ENCOUNTER — OFFICE VISIT (OUTPATIENT)
Dept: CARDIOLOGY CLINIC | Age: 53
End: 2023-02-13
Payer: COMMERCIAL

## 2023-02-13 VITALS
DIASTOLIC BLOOD PRESSURE: 80 MMHG | OXYGEN SATURATION: 97 % | SYSTOLIC BLOOD PRESSURE: 128 MMHG | WEIGHT: 258 LBS | BODY MASS INDEX: 44.05 KG/M2 | HEART RATE: 82 BPM | TEMPERATURE: 98.6 F | HEIGHT: 64 IN

## 2023-02-13 DIAGNOSIS — I25.5 ISCHEMIC CARDIOMYOPATHY: ICD-10-CM

## 2023-02-13 DIAGNOSIS — Z95.1 S/P THREE VESSEL CORONARY ARTERY BYPASS: ICD-10-CM

## 2023-02-13 DIAGNOSIS — Z95.1 S/P CABG (CORONARY ARTERY BYPASS GRAFT): ICD-10-CM

## 2023-02-13 DIAGNOSIS — I25.110 CORONARY ARTERY DISEASE INVOLVING NATIVE CORONARY ARTERY OF NATIVE HEART WITH UNSTABLE ANGINA PECTORIS (HCC): Primary | ICD-10-CM

## 2023-02-13 DIAGNOSIS — I25.10 CAD, MULTIPLE VESSEL: ICD-10-CM

## 2023-02-13 DIAGNOSIS — S21.109A WOUND OF STERNAL REGION: ICD-10-CM

## 2023-02-13 PROCEDURE — 99214 OFFICE O/P EST MOD 30 MIN: CPT | Performed by: INTERNAL MEDICINE

## 2023-02-13 RX ORDER — ATORVASTATIN CALCIUM 40 MG/1
40 TABLET, FILM COATED ORAL NIGHTLY
Qty: 30 TABLET | Refills: 11 | Status: SHIPPED | OUTPATIENT
Start: 2023-02-13 | End: 2023-03-15

## 2023-02-13 RX ORDER — CARVEDILOL 6.25 MG/1
TABLET ORAL
Qty: 60 TABLET | Refills: 11 | Status: SHIPPED | OUTPATIENT
Start: 2023-02-13

## 2023-02-13 RX ORDER — CLOPIDOGREL BISULFATE 75 MG/1
75 TABLET ORAL DAILY
Qty: 30 TABLET | Refills: 11 | Status: SHIPPED | OUTPATIENT
Start: 2023-02-13 | End: 2023-03-15

## 2023-02-13 RX ORDER — LISINOPRIL 5 MG/1
5 TABLET ORAL DAILY
Qty: 30 TABLET | Refills: 11 | Status: SHIPPED | OUTPATIENT
Start: 2023-02-13

## 2023-02-13 RX ORDER — ASPIRIN 81 MG/1
81 TABLET ORAL DAILY
Qty: 30 TABLET | Refills: 11 | Status: SHIPPED | OUTPATIENT
Start: 2023-02-13 | End: 2023-03-15

## 2023-02-13 NOTE — PATIENT INSTRUCTIONS
Plan:  Current medications reviewed. Refills given as warranted. Goal BP is less than 130/80, if you are consistently above goal call and let me know so I can adjust your medications  Recommend following up with pcp as scheduled to work on your A1C  I think it is ok to take the bandage off of your incision. 5.   Call Dr. Hernandez Gentle office to see what he recommends putting on your incision. Also ask his office about your physical restrictions with work. 6.   You will be on Aspirin and Plavix until October 7, 2023. Then on October 8, 2023 you can stop taking plavix. You will be on aspirin for life  7. If you develop a dry cough from the lisinopril call and let me know and I can switch you to a different medication. 8.   Referral given to cardiac rehab at [address] [place] [address]. Your heart strength was 35% before your heart attack. You heart strength was 55% on 2/1/23 which is back to normal.  This is excellent!     Follow up with me in 6 months

## 2023-02-21 ENCOUNTER — HOSPITAL ENCOUNTER (OUTPATIENT)
Dept: CARDIAC REHAB | Age: 53
Setting detail: THERAPIES SERIES
Discharge: HOME | End: 2023-02-21
Payer: COMMERCIAL

## 2023-02-21 VITALS — SYSTOLIC BLOOD PRESSURE: 110 MMHG | HEART RATE: 83 BPM | DIASTOLIC BLOOD PRESSURE: 62 MMHG

## 2023-02-21 RX ORDER — CLINDAMYCIN HYDROCHLORIDE 300 MG/1
300 CAPSULE ORAL EVERY 6 HOURS
COMMUNITY
End: 2023-02-21

## 2023-02-21 RX ORDER — SULFAMETHOXAZOLE AND TRIMETHOPRIM 800; 160 MG/1; MG/1
1 TABLET ORAL 2 TIMES DAILY
COMMUNITY
End: 2023-02-21

## 2023-02-21 ASSESSMENT — PATIENT HEALTH QUESTIONNAIRE - PHQ9
10. IF YOU CHECKED OFF ANY PROBLEMS, HOW DIFFICULT HAVE THESE PROBLEMS MADE IT FOR YOU TO DO YOUR WORK, TAKE CARE OF THINGS AT HOME, OR GET ALONG WITH OTHER PEOPLE: 0
SUM OF ALL RESPONSES TO PHQ QUESTIONS 1-9: 0
SUM OF ALL RESPONSES TO PHQ9 QUESTIONS 1 & 2: 0
8. MOVING OR SPEAKING SO SLOWLY THAT OTHER PEOPLE COULD HAVE NOTICED. OR THE OPPOSITE, BEING SO FIGETY OR RESTLESS THAT YOU HAVE BEEN MOVING AROUND A LOT MORE THAN USUAL: 0
6. FEELING BAD ABOUT YOURSELF - OR THAT YOU ARE A FAILURE OR HAVE LET YOURSELF OR YOUR FAMILY DOWN: 0
SUM OF ALL RESPONSES TO PHQ QUESTIONS 1-9: 0
3. TROUBLE FALLING OR STAYING ASLEEP: 0
7. TROUBLE CONCENTRATING ON THINGS, SUCH AS READING THE NEWSPAPER OR WATCHING TELEVISION: 0
SUM OF ALL RESPONSES TO PHQ QUESTIONS 1-9: 0
1. LITTLE INTEREST OR PLEASURE IN DOING THINGS: 0
9. THOUGHTS THAT YOU WOULD BE BETTER OFF DEAD, OR OF HURTING YOURSELF: 0
4. FEELING TIRED OR HAVING LITTLE ENERGY: 0
SUM OF ALL RESPONSES TO PHQ QUESTIONS 1-9: 0
2. FEELING DOWN, DEPRESSED OR HOPELESS: 0
5. POOR APPETITE OR OVEREATING: 0

## 2023-02-21 ASSESSMENT — EXERCISE STRESS TEST
PEAK_RPD: 0.1
PEAK_BP: 122/64
PEAK_RPE: 11/20
PEAK_HR: 105

## 2023-02-21 ASSESSMENT — EJECTION FRACTION: EF_VALUE: 55

## 2023-02-21 NOTE — PLAN OF CARE
Individual Treatment Plan  Cardiac Rehabilitation    Initial Assessment  Name: Álvaro Bejarano Admit to Rehab: 2023   : 1970 Primary Diagnosis: CABG    Age: 47 y.o. Referring Physician:  Mari Cheema   MRN: [id number] Primary Care Physician: Bekah Palmer, DO   No Known Allergies    Patient most concerned about being healthy, controlling diabetes, and her heart health. Patient health goals are no repeat heart problems, and getting healthy. Exercise Assessment  Stages of Change: Pre-Contemplation   Risk Stratification: Medium    Fall Risk: No fall risk factors  Assistive Devices:None  Initial Assessment: 6 Minute Walk Test   Pre-Test Vitals: Data Measured Before Walk  Heart Rate: 70  Blood Pressure: 108/60  O2 Saturation: 99  O2 Device: Room air   Peak Vitals: Data Measured Immediately After Walk  Distance Walked in : Feet   Distance Walked (ft): 1265 ft  Peak Heart Rate: 105  Peak Blood Pressure: 122/64  Modified Ivette Scale: Very slight     During: Data Measured during Walk  Symptoms: Fatigue  Any problems while exercising: no  Peak RPE: 11/20  Peak RPD: 0.1   Number of Rest: 0  Post-Test Vitals: Data Measured at 5 Minutes After Walk  Heart Rate: 69  Blood Pressure: 102/68  SpO2: 99 %  O2 Device: Room air   Exercise Prescription        Mode: . Treadmill, Bike (Upright or recumbent), NuStep, Rower, SciFit, Arm Ergometer, Recumbent Eliptical, and Walking      Frequency: 2-3 days/week supervised      Intensity:RPE 11-14      Target Heart Rate: Resting HR + 20-30      Duration: 30+ minutes/day      Resistance Training: Yes, 3+ days per week      Progression:Increase exercise by 5 minutes every week to goal of 30 to 50 minutes and Increase resistance by 1 to 3 pounds every 1 to 2 weeks to goal of returned baseline strength level      Supplemental oxygen:No    Plan  Home Exercise: Yes  Mode:Walking  Resistance Training:No  Target Goals  Adhere to 5 days per week exercise program , Adhere to cardiac exercise guidelines , Increase exercise endurance and stamina , Increase functional capacity as compared to initial assessment , and Participate in strength training   Education  Ms. Hieu Villarreal was educated on the importance of   warm up and cool down, signs and symptoms to report, exercise safety, RPE scale, Importance of physical activity and exercise progression, equipment orientation, home exercise program, and blood pressure medication  Nutrition Assessment  Stages of Change: Pre-Contemplation            Nutrition Survey: Rate Your Plate Score: Rate Your Plate Total Score: 60     Patient Weight Goal: 259.7lb current weight, goal 180lb   Recommended DietIncrease consumption of fruit and vegetable to 8-10 servings a day, increase whole grains and low sodium and lowfat proteins, Reduce saturated fat and cholesterol, Decrease caloric intake, Decrease intake of pop/soda, Decrease intake of processed foods, and Decrease intake of refined sugars  Diabetic:Yes  Key Antihyperglycemic Medications            insulin aspart (NOVOLOG) 100 UNIT/ML injection pen    Sig - Route: Inject 4 Units into the skin 3 times daily (before meals) - SubCUTAneous    Class: Print    insulin glargine (LANTUS;BASAGLAR) 100 UNIT/ML injection pen    Sig - Route: Inject 12 Units into the skin nightly - SubCUTAneous    Patient taking differently: Inject 16 Units into the skin nightly    Class: Print           Key Hyperlipidemia Meds            atorvastatin (LIPITOR) 40 MG tablet    Sig - Route:  Take 1 tablet by mouth nightly - Oral               Lab Results   Component Value Date    GLUCOSE 217 (H) 02/01/2023    LABA1C 13.8 10/10/2022    CHOL 135 02/01/2023    HDL 54 02/01/2023    LDLCALC 65 02/01/2023    TRIG 79 02/01/2023       Blood Pressure  Key Anti-Hypertensive Meds            carvedilol (COREG) 6.25 MG tablet    Sig: Take 1 tablet by mouth 2 times daily Hold for SBP <100 or HR <65    lisinopril (PRINIVIL;ZESTRIL) 5 MG tablet    Sig - Route: Take 1 tablet by mouth daily - Oral          Nutrition Intervention  Complete diet survey and Participate in an exercise program that promotes weight loss  Target Goals  Control blood pressure , Control cholesterol values , Control triglyceride values , Manage blood sugars independently , and Reduce weight   Blood Pressure <140/90 or  <130/80 if diabetic or chronic kidney disease.  Education  Ms. Medrano was educated on the importance ofmaintaining optimal weight/BMI, nutrition guidelines, cholesterol reduction, nutrition label literacy, portion control, adequate protein intake, and signs and symptoms of hypoglycemia  Tobacco Assessment  Social History     Tobacco Use   Smoking Status Never   Smokeless Tobacco Never     Tobacco Intervention  Smoking cessation referral: No    Individual education and counseling:  No  Smoking triggers:  Pharmacy Intervention: No    Target goals   Complete Tobacco cessation  Education  Smoking and coronary artery disease  Psychosocial Assessment  Stages of Change:Action  Social History     Socioeconomic History    Marital status:      Spouse name: Not on file    Number of children: Not on file    Years of education: Not on file    Highest education level: Not on file   Occupational History    Not on file   Tobacco Use    Smoking status: Never    Smokeless tobacco: Never   Vaping Use    Vaping Use: Never used   Substance and Sexual Activity    Alcohol use: No    Drug use: No    Sexual activity: Yes     Partners: Male   Other Topics Concern    Not on file   Social History Narrative    Not on file     Social Determinants of Health     Financial Resource Strain: Not on file   Food Insecurity: Not on file   Transportation Needs: Not on file   Physical Activity: Not on file   Stress: Not on file   Social Connections: Not on file   Intimate Partner Violence: Not on file   Housing Stability: Not on file      Psychosocial Test  Today's PHQ:    PHQ Scores 2/21/2023   PHQ2 Score 0  PHQ9 Score 0     Interpretation of Total Score Depression Severity: 1-4 = Minimal depression, 5-9 = Mild depression, 10-14 = Moderate depression, 15-19 = Moderately severe depression, 20-27 = Severe depression    Psychosocial Assessment  Does the Patient Report Any of the Following: Patient denies any issues at this time  Positive Aspects of Family and Home Situation That May Affect Treatment : Living spouse or significant other; Spiritual; [place] caring support system; Socially included  Negative Aspects of Family and Home Situation That May Affect Treatment: Patient denies any issues at this time  Currently Taking Psychotropic Meds: No  Medication Changes: Yes  Are You Aware of Any Stress and/or Tension in Your Life: Yes  Level of Stress and/or Tension: Moderate  What Areas Cause You the Most Stress and/or Tension: eat  How Do You Deal/[place] With Stress and/or Tension: eat  What Are Your Calming Techniques: eat  Have You Been Abused or a Victim of a Crime: No  Evidence of Abuse or Neglect: Patient denies any issues at this time      Psychosocial Intervention  Attend education classes related to stress management and relaxation and Participate in an exercise program that improves psychosocial symptoms  Target Goals  Maximize stress management/coping skills  and Improve quality of live, self-efficacy and depression screening scores as measured by the appropriate tool   Learning Assessment  Stages of Change: Pre-Contemplation  Barriers to Learning: No barriers  Personal Learning Style:Verbal and Written    Education Intervention/Learning Needs Identified  Blood pressure, Cholesterol, Diabetes, Exercise, Fall prevention, Infection prevention, Medications, Nutrition, Signs and symptoms of MI/CVA, Stregnth training, Stress management and relaxation, and Weight management  Target Goals  Attend appropriate web education classes  and Restore to highest level of independent functionality   Education  Ms. Sj Ackerman was educated on the importance of healthy coping techniques and stress management                     Provider Review and Approval of this ITP    The above treatment plan and goals have been set for your patient during Cardiac Rehabilitation.  Please review and Electronically Cosign        Electronically signed by Kim Lindsey RN on 2/21/23 at 11:18 AM EST

## 2023-02-22 ENCOUNTER — HOSPITAL ENCOUNTER (OUTPATIENT)
Dept: CARDIAC REHAB | Age: 53
Setting detail: THERAPIES SERIES
Discharge: HOME | End: 2023-02-22
Payer: COMMERCIAL

## 2023-02-22 PROCEDURE — 93798 PHYS/QHP OP CAR RHAB W/ECG: CPT

## 2023-02-24 ENCOUNTER — HOSPITAL ENCOUNTER (OUTPATIENT)
Dept: CARDIAC REHAB | Age: 53
Setting detail: THERAPIES SERIES
Discharge: HOME | End: 2023-02-24
Payer: COMMERCIAL

## 2023-02-24 PROCEDURE — 93798 PHYS/QHP OP CAR RHAB W/ECG: CPT

## 2023-02-27 ENCOUNTER — HOSPITAL ENCOUNTER (OUTPATIENT)
Dept: CARDIAC REHAB | Age: 53
Setting detail: THERAPIES SERIES
Discharge: HOME | End: 2023-02-27
Payer: COMMERCIAL

## 2023-02-27 PROCEDURE — 93798 PHYS/QHP OP CAR RHAB W/ECG: CPT

## 2023-03-01 ENCOUNTER — HOSPITAL ENCOUNTER (OUTPATIENT)
Dept: CARDIAC REHAB | Age: 53
Setting detail: THERAPIES SERIES
Discharge: HOME | End: 2023-03-01
Payer: COMMERCIAL

## 2023-03-01 PROCEDURE — 93798 PHYS/QHP OP CAR RHAB W/ECG: CPT

## 2023-03-03 ENCOUNTER — HOSPITAL ENCOUNTER (OUTPATIENT)
Dept: CARDIAC REHAB | Age: 53
Setting detail: THERAPIES SERIES
Discharge: HOME | End: 2023-03-03
Payer: COMMERCIAL

## 2023-03-03 PROCEDURE — 93798 PHYS/QHP OP CAR RHAB W/ECG: CPT

## 2023-03-06 ENCOUNTER — HOSPITAL ENCOUNTER (OUTPATIENT)
Dept: CARDIAC REHAB | Age: 53
Setting detail: THERAPIES SERIES
Discharge: HOME | End: 2023-03-06
Payer: COMMERCIAL

## 2023-03-06 PROCEDURE — 93798 PHYS/QHP OP CAR RHAB W/ECG: CPT

## 2023-03-08 ENCOUNTER — HOSPITAL ENCOUNTER (OUTPATIENT)
Dept: CARDIAC REHAB | Age: 53
Setting detail: THERAPIES SERIES
Discharge: HOME | End: 2023-03-08
Payer: COMMERCIAL

## 2023-03-08 PROCEDURE — 93798 PHYS/QHP OP CAR RHAB W/ECG: CPT

## 2023-03-10 ENCOUNTER — HOSPITAL ENCOUNTER (OUTPATIENT)
Dept: CARDIAC REHAB | Age: 53
Setting detail: THERAPIES SERIES
Discharge: HOME | End: 2023-03-10
Payer: COMMERCIAL

## 2023-03-10 PROCEDURE — 93798 PHYS/QHP OP CAR RHAB W/ECG: CPT

## 2023-03-13 ENCOUNTER — HOSPITAL ENCOUNTER (OUTPATIENT)
Dept: CARDIAC REHAB | Age: 53
Setting detail: THERAPIES SERIES
Discharge: HOME | End: 2023-03-13
Payer: COMMERCIAL

## 2023-03-13 PROCEDURE — 93798 PHYS/QHP OP CAR RHAB W/ECG: CPT

## 2023-03-15 ENCOUNTER — HOSPITAL ENCOUNTER (OUTPATIENT)
Dept: CARDIAC REHAB | Age: 53
Setting detail: THERAPIES SERIES
Discharge: HOME | End: 2023-03-15
Payer: COMMERCIAL

## 2023-03-15 PROCEDURE — 93798 PHYS/QHP OP CAR RHAB W/ECG: CPT

## 2023-03-15 NOTE — PLAN OF CARE
Individual Treatment Plan  Cardiac Rehabilitation    30 day Reassessment  Name: Venkatesh Serrato Reassessment: 30 days   : 1970 Primary Diagnosis:   CABG    Age: 47 y.o. Referring Physician:   Katia Collier   MRN: [id number] Primary Care Physician: Gisselle Lizarraga, DO   No Known Allergies    Patient most concerned about being healthy, controlling diabetes, and her heart health. Patient health goals are no repeat heart problems, and getting healthy. Exercise Assessment  Stages of Change: Action   Risk Stratification: Medium  Fall Risk: No flowsheet data found. Assistive Devices:  None   Sessions completed: 10/36  Key Anti-Hypertensive Meds            carvedilol (COREG) 6.25 MG tablet    Sig: Take 1 tablet by mouth 2 times daily Hold for SBP <100 or HR <65    lisinopril (PRINIVIL;ZESTRIL) 5 MG tablet    Sig - Route: Take 1 tablet by mouth daily - Oral           Exercise Prescription        Mode: . Treadmill, Bike (Upright or recumbent), NuStep, Rower, SciFit, Arm Ergometer, Elliptical, and Walking      Frequency: 2-3 days/week supervised      Intensity:RPE 11-14      Current Target Heart Rate: Resting HR + 20-30      New Target Heart Rate zone:      Duration: 30+ minutes/day      Resistance Training: Yes, 5 days per week      Progression: Increase exercise by 5 minutes every week to goal of 30 to 50 minutes and Increase resistance by 1 to 3 pounds every 1 to 2 weeks to goal of returned baseline strength level      Supplemental oxygen: is not on home oxygen therapy. Plan  Home Exercise:Yes  Mode:Walking  Resistance Training: no   Target Goals  Adhere to 5 days per week exercise program , Increase exercise endurance and stamina , and Increase functional capacity as compared to initial assessment   Previous goals:  Increase exercise by 5 minutes every week to goal of 30 to 50 minutes and Increase resistance by 1 to 3 pounds every 1 to 2 weeks to goal of returned baseline strength level  Progress toward goal:patient has participated in 10/36 cardiac rehab sessions.   Education  Ms. Medrano was educated on the importance of warm up and cool down, signs and symptoms to report, exercise safety, RPE scale, home exercise program, blood pressure education, and blood pressure medication    Nutrition Assessment  Stages of Change: Action            Rate Your Plate Score:  60, patient reported making small changes    Patient's weight goal/Progress: goal weight 180lb, current weight 261lbs  Recommended Diet:Increase consumption of fruit and vegetable to 8-10 servings a day, increase whole grains and low sodium and lowfat proteins, Reduce saturated fat and cholesterol, Decrease caloric intake, Decrease intake of processed foods, and Decrease intake of refined sugars   Diabetic:No  Key Antihyperglycemic Medications            insulin aspart (NOVOLOG) 100 UNIT/ML injection pen    Sig - Route: Inject 4 Units into the skin 3 times daily (before meals) - SubCUTAneous    Class: Print    insulin glargine (LANTUS;BASAGLAR) 100 UNIT/ML injection pen    Sig - Route: Inject 12 Units into the skin nightly - SubCUTAneous    Patient taking differently: Inject 16 Units into the skin nightly    Class: Print           Lab Results   Component Value Date    LABA1C 13.8 10/10/2022                  Nutrition Intervention  Attend education classes related to nutrition  Target Goals  Control blood pressure , Control cholesterol values , Control triglyceride values , Manage blood sugars independently , and Reduce weight   Previous goals:  Complete diet survey and Participate in an exercise program that promotes weight loss  Progress toward goal: patient completed diet survey, patient educated on controlling blood pressure, patient managing blood glucose levels independently.   Education  Ms. Medrano was educated on the importance of maintaining optimal weight/BMI, nutrition guidelines, cholesterol reduction, nutrition label literacy, portion  control, adequate protein intake, and signs and symptoms of hypoglycemia. Psychosocial Assessment  Stages of Change: Action    Psychosocial Test  PHQ-2/9 Today's PHQ:    PHQ Scores 2/21/2023   PHQ2 Score 0   PHQ9 Score 0     Interpretation of Total Score Depression Severity: 1-4 = Minimal depression, 5-9 = Mild depression, 10-14 = Moderate depression, 15-19 = Moderately severe depression, 20-27 = Severe depression    Psychosocial Intervention  Attend education classes related to stress management and relaxation and Participate in an exercise program that improves psychosocial symptoms  Target Goals  Previous goals: Maximize stress management/coping skills  and Improve quality of live, self-efficacy and depression screening scores as measured by the appropriate tool   Progress toward goal:Attend education classes related to stress management and relaxation and Participate in an exercise program that improves psychosocial symptoms  New goal: Maximize stress management/coping skills  and Improve quality of live, self-efficacy and depression screening scores as measured by the appropriate tool   Stages of Change: Action  Barriers to Learning: No barriers  Personal Learning Style:Verbal and Written     Education Intervention/Learning Needs Identified  Blood pressure, Cholesterol, Diabetes, Exercise, Fall prevention, Infection prevention, Medications, Stregnth training, and Stress management and relaxation  Tobacco Assessment  Social History     Tobacco Use   Smoking Status Never   Smokeless Tobacco Never     Tobacco Intervention  Smoking cessation referral: No    Individual education and counseling:  No  Smoking triggers:  Pharmacy Intervention: No    Target goals   Complete Tobacco cessation  Education  Smoking and coronary artery disease  Target Goals  Previous goals: Attend appropriate web education classes   Progress toward goals: Patient attending cardiac rehab sessions.    New goals: Attend appropriate web education classes   Education  Ms. Dixon Ron was educated on the importance of healthy coping techniques and stress management and relaxation techniques                     Provider Review and Approval of this ITP    The above treatment plan and goals have been set for your patient during Cardiac Rehabilitation.  Please review and Electronically Cosign      Electronically signed by An Zhou RN on 3/15/23 at 4:30 PM EDT

## 2023-03-17 ENCOUNTER — HOSPITAL ENCOUNTER (OUTPATIENT)
Dept: CARDIAC REHAB | Age: 53
Setting detail: THERAPIES SERIES
Discharge: HOME | End: 2023-03-17
Payer: COMMERCIAL

## 2023-03-20 ENCOUNTER — HOSPITAL ENCOUNTER (OUTPATIENT)
Dept: CARDIAC REHAB | Age: 53
Setting detail: THERAPIES SERIES
Discharge: HOME | End: 2023-03-20
Payer: COMMERCIAL

## 2023-03-20 PROCEDURE — 93798 PHYS/QHP OP CAR RHAB W/ECG: CPT

## 2023-03-22 ENCOUNTER — HOSPITAL ENCOUNTER (OUTPATIENT)
Dept: CARDIAC REHAB | Age: 53
Setting detail: THERAPIES SERIES
Discharge: HOME | End: 2023-03-22
Payer: COMMERCIAL

## 2023-03-22 PROCEDURE — 93798 PHYS/QHP OP CAR RHAB W/ECG: CPT

## 2023-03-24 ENCOUNTER — HOSPITAL ENCOUNTER (OUTPATIENT)
Dept: CARDIAC REHAB | Age: 53
Setting detail: THERAPIES SERIES
Discharge: HOME | End: 2023-03-24
Payer: COMMERCIAL

## 2023-03-24 NOTE — PROGRESS NOTES
Patient called and reports having itchy throat, cold and fever. Symptoms started on 3/22/23. appointment cancelled.

## 2023-03-27 ENCOUNTER — HOSPITAL ENCOUNTER (OUTPATIENT)
Dept: CARDIAC REHAB | Age: 53
Setting detail: THERAPIES SERIES
End: 2023-03-27
Payer: COMMERCIAL

## 2023-03-29 ENCOUNTER — APPOINTMENT (OUTPATIENT)
Dept: CARDIAC REHAB | Age: 53
End: 2023-03-29
Payer: COMMERCIAL

## 2023-03-31 ENCOUNTER — HOSPITAL ENCOUNTER (OUTPATIENT)
Dept: CARDIAC REHAB | Age: 53
Setting detail: THERAPIES SERIES
End: 2023-03-31
Payer: COMMERCIAL

## 2023-04-03 ENCOUNTER — HOSPITAL ENCOUNTER (OUTPATIENT)
Dept: CARDIAC REHAB | Age: 53
Setting detail: THERAPIES SERIES
Discharge: HOME | End: 2023-04-03
Payer: COMMERCIAL

## 2023-04-03 PROCEDURE — 93798 PHYS/QHP OP CAR RHAB W/ECG: CPT

## 2023-04-05 ENCOUNTER — HOSPITAL ENCOUNTER (OUTPATIENT)
Dept: CARDIAC REHAB | Age: 53
Setting detail: THERAPIES SERIES
Discharge: HOME | End: 2023-04-05
Payer: COMMERCIAL

## 2023-04-05 ENCOUNTER — TELEPHONE (OUTPATIENT)
Dept: CARDIAC REHAB | Age: 53
End: 2023-04-05

## 2023-04-05 NOTE — TELEPHONE ENCOUNTER
Call received from patient she went to [facility] today. Her eyes were dilated and she is not safe to drive to cardiac rehab today. Patient plans to return to cardiac rehab on 4/7/23.

## 2023-04-07 ENCOUNTER — HOSPITAL ENCOUNTER (OUTPATIENT)
Dept: CARDIAC REHAB | Age: 53
Setting detail: THERAPIES SERIES
Discharge: HOME | End: 2023-04-07
Payer: COMMERCIAL

## 2023-04-07 PROCEDURE — 93798 PHYS/QHP OP CAR RHAB W/ECG: CPT

## 2023-04-10 ENCOUNTER — APPOINTMENT (OUTPATIENT)
Dept: CARDIAC REHAB | Age: 53
End: 2023-04-10
Payer: COMMERCIAL

## 2023-04-17 ENCOUNTER — APPOINTMENT (OUTPATIENT)
Dept: CARDIAC REHAB | Age: 53
End: 2023-04-17
Payer: COMMERCIAL

## 2023-04-24 ENCOUNTER — APPOINTMENT (OUTPATIENT)
Dept: CARDIAC REHAB | Age: 53
End: 2023-04-24
Payer: COMMERCIAL

## 2023-04-25 ASSESSMENT — EJECTION FRACTION: EF_VALUE: 55

## 2023-04-25 NOTE — PROGRESS NOTES
Patient reports unable to complete cardiac rehab sessions due to \"life\" patient reports she is exercising three to four times a week. Patient reports eating better.

## 2023-04-26 ENCOUNTER — HOSPITAL ENCOUNTER (OUTPATIENT)
Dept: CARDIAC REHAB | Age: 53
Setting detail: THERAPIES SERIES
Discharge: HOME | End: 2023-04-26
Payer: COMMERCIAL

## 2023-04-28 ENCOUNTER — APPOINTMENT (OUTPATIENT)
Dept: CARDIAC REHAB | Age: 53
End: 2023-04-28
Payer: COMMERCIAL

## 2023-08-03 ENCOUNTER — TELEPHONE (OUTPATIENT)
Dept: CARDIOLOGY CLINIC | Age: 53
End: 2023-08-03

## 2023-08-03 NOTE — TELEPHONE ENCOUNTER
Blood Pressure Problems:      1) When was the last time you took your blood pressure? This am 8/3/2023        2) What was your blood pressure reading:  160/90  3) Do you have your blood pressure readings written down within the last week?yes    4) What symptoms are you experiencing?  (headache? Dizziness? SOB? CP? )headache, if she stands up to fast she gets dizzy, not SOB    5) How long have had these symptoms? Several weeks    6) What medications are you taking for your BP?  carvedilol (COREG) 6.25 MG tablet sometimes she forgets to take at exact time, may take little later. lisinopril (PRINIVIL;ZESTRIL) 5 MG tablet        7) Have you had any recent dietary or medication changes? No medication changes, has been trying to modify diet due to elevated glucose. Pt sts her left arm goes to sleep, which wakes her up and she shakes it out, sometimes it wake up quick other times takes awhile. Pt did slip at work and hit her head hard on door jam. She though she would mention this due to headache.

## 2023-08-14 ENCOUNTER — OFFICE VISIT (OUTPATIENT)
Dept: CARDIOLOGY CLINIC | Age: 53
End: 2023-08-14
Payer: COMMERCIAL

## 2023-08-14 VITALS
BODY MASS INDEX: 47.46 KG/M2 | SYSTOLIC BLOOD PRESSURE: 126 MMHG | DIASTOLIC BLOOD PRESSURE: 70 MMHG | WEIGHT: 278 LBS | HEIGHT: 64 IN | HEART RATE: 68 BPM | OXYGEN SATURATION: 98 %

## 2023-08-14 DIAGNOSIS — I25.110 CORONARY ARTERY DISEASE INVOLVING NATIVE CORONARY ARTERY OF NATIVE HEART WITH UNSTABLE ANGINA PECTORIS (HCC): Primary | ICD-10-CM

## 2023-08-14 DIAGNOSIS — I25.10 CAD, MULTIPLE VESSEL: ICD-10-CM

## 2023-08-14 DIAGNOSIS — Z95.1 S/P THREE VESSEL CORONARY ARTERY BYPASS: ICD-10-CM

## 2023-08-14 DIAGNOSIS — Z79.899 MEDICATION MANAGEMENT: ICD-10-CM

## 2023-08-14 DIAGNOSIS — I25.5 ISCHEMIC CARDIOMYOPATHY: ICD-10-CM

## 2023-08-14 PROCEDURE — 99214 OFFICE O/P EST MOD 30 MIN: CPT | Performed by: INTERNAL MEDICINE

## 2023-08-14 NOTE — PROGRESS NOTES
[facility]   Cardiac Office Note    Referring Provider:  Viji Escobar DO     Chief Complaint   Patient presents with    Follow-up    Coronary Artery Disease    Cardiomyopathy      Subjective: Ms is here today cardiology follow up; no complaints today     History of Present Illness:   Henri Daniel is a 47 y.o. female who has PMH HTN, HLD, NSTEMI and CAD s/p 3V CABG and ANATOLIY clip 10/7/22 per Dr. Raissa Eric, resolved ICM (prior 35-40% EF now 55%), prior medication noncompliance (before CABG), DM, s/p thyroidectomy. Carotid doppler 10/7/22 <50% stenoses bilaterally. The right vertebral artery demonstrates abnormal highly resistive flow, suggesting distal stenosis/occlusion. [address] 10/7/22 [facility]. S/P 3V CABG and ANATOLIY clip 10/10/22. LIMA to LAD reverse saphenous vein to OM reverse saphenous vein to PDA. On 22 s/p sternal wound debridement with wound vac placement. ECHO 10/7/22 LVEF=35-40%. moderate cLVH; +WMA; Grade I DD. Mild MR, TR; (SPAP) est. 41 mmHg c/w mild pulm HTN. Most recent limited ECHO 23 LVEF=55%. Today, she is feeling good. She reports she thinks stress and take-out and eating out with coworkers has caused her weight gain. She tried ozempic for weight loss and it didn't work for her along with causing GI distress and constipation. She was starving after trying ozempic. Her last A1c was 6.9 which was down from 12. Her incision sore healed on its own. She is having trouble remembering to take 2nd dose of coreg at 5pm. Patient denies current edema, chest pain, sob, palpitations, dizziness or syncope. Patient is taking all cardiac medications as prescribed and tolerates them well. Weight today is 278# (Up 20# from 2023) 258#    Past Medical History:   has a past medical history of Anemia, Diabetes mellitus ([address]), Hypertension, and Thyroid disease. Surgical History:   has a past surgical history that includes Cholecystectomy;  section;  Thyroidectomy
nightly 2/13/23 3/15/23  Rea Najjar, MD   carvedilol (COREG) 6.25 MG tablet Take 1 tablet by mouth 2 times daily Hold for SBP <100 or HR <65 2/13/23   Rea Najjar, MD   clopidogrel (PLAVIX) 75 MG tablet Take 1 tablet by mouth daily 2/13/23 3/15/23  Rea Najjar, MD   lisinopril (PRINIVIL;ZESTRIL) 5 MG tablet Take 1 tablet by mouth daily 2/13/23   Rea Najjar, MD   aspirin 81 MG EC tablet Take 1 tablet by mouth daily 2/13/23 3/15/23  Rea Najjar, MD   insulin aspart (NOVOLOG) 100 UNIT/ML injection pen Inject 4 Units into the skin 3 times daily (before meals) 10/14/22 2/21/23  [address], APRN - CNP   insulin glargine (LANTUS;BASAGLAR) 100 UNIT/ML injection pen Inject 12 Units into the skin nightly  Patient taking differently: Inject 16 Units into the skin nightly 10/14/22 2/21/23  [address], APRN - CNP        Allergies:  Patient has no known allergies. Review of Systems:   Constitutional: there has been no unanticipated weight loss. There's been no change in energy level, sleep pattern, or activity level. Eyes: No visual changes or diplopia. No scleral icterus. ENT: No Headaches, hearing loss or vertigo. No mouth sores or sore throat. Cardiovascular: Reviewed in HPI  Respiratory: No cough or wheezing, no sputum production. No hematemesis. Gastrointestinal: No abdominal pain, appetite loss, blood in stools. No change in bowel or bladder habits. Genitourinary: No dysuria, trouble voiding, or hematuria. Musculoskeletal:  No gait disturbance, weakness or joint complaints. Integumentary: No rash or pruritis. Neurological: No headache, diplopia, change in muscle strength, numbness or tingling. No change in gait, balance, coordination, mood, affect, memory, mentation, behavior. Psychiatric: No anxiety, no depression. Endocrine: No malaise, fatigue or temperature intolerance. No excessive thirst, fluid intake, or urination.  No

## 2023-08-14 NOTE — PATIENT INSTRUCTIONS
Plan:  Current medications reviewed. Refills given as warranted. Recommend trying to watch your weight. Try decreasing portion size, limiting heavy cream sauces, butter, desserts, sugary drinks. Recommend talking to your pcp about seeing an endocrinologist at least one time to help with your sugar control. Risks and benefits of being on two blood thinners discussed. Recommend taking both blood thinners long term. If you begin having bleeding problems then I can stop one. It is ok to take motrin occasionally as needed with food in your stomach  It is ok to take your second dose of carvedilol at bedtime with your other medication. You can have extra swelling and sensation in the leg where your veins were harvested. You can help decrease your swelling by:  -Weighing yourself every morning   -Following a No Added Salt/Low Sodium diet of less than 3000 mg sodium daily  -Following a Fluid Limitation of less than 2 liters (64 ounces daily)                           -this also includes foods like soup, ice cream, popsicles  -Elevating your legs when sitting  -Wearing compression socks during the day  No additional cardiac testing at this time. Normal heart rate range is  bpm.  Carvedilol lowers the heart rate which is why you are now running in the 60's. Repeat blood work  -CBC, CMP, TSH, Lipids   -you will need to be fasting for blood work  -it is ok to take your medicine with sips of water or black coffee     Follow up with me in 6 months, call in December to make your next appointment.

## 2024-01-09 DIAGNOSIS — Z95.1 S/P CABG (CORONARY ARTERY BYPASS GRAFT): ICD-10-CM

## 2024-01-09 RX ORDER — ATORVASTATIN CALCIUM 40 MG/1
40 TABLET, FILM COATED ORAL DAILY
Qty: 90 TABLET | Refills: 2 | Status: SHIPPED | OUTPATIENT
Start: 2024-01-09

## 2024-01-09 RX ORDER — CLOPIDOGREL BISULFATE 75 MG/1
75 TABLET ORAL DAILY
Qty: 90 TABLET | Refills: 2 | Status: SHIPPED | OUTPATIENT
Start: 2024-01-09

## 2024-01-09 RX ORDER — CARVEDILOL 6.25 MG/1
TABLET ORAL
Qty: 180 TABLET | Refills: 2 | Status: SHIPPED | OUTPATIENT
Start: 2024-01-09

## 2024-01-09 RX ORDER — ASPIRIN 81 MG/1
TABLET, COATED ORAL
Qty: 90 TABLET | Refills: 2 | Status: SHIPPED | OUTPATIENT
Start: 2024-01-09

## 2024-01-09 RX ORDER — LISINOPRIL 5 MG/1
5 TABLET ORAL DAILY
Qty: 90 TABLET | Refills: 2 | Status: SHIPPED | OUTPATIENT
Start: 2024-01-09

## 2024-02-05 ENCOUNTER — TELEPHONE (OUTPATIENT)
Dept: CARDIOLOGY CLINIC | Age: 54
End: 2024-02-05

## 2024-02-05 DIAGNOSIS — Z79.899 MEDICATION MANAGEMENT: Primary | ICD-10-CM

## 2024-02-05 DIAGNOSIS — I25.110 CORONARY ARTERY DISEASE INVOLVING NATIVE CORONARY ARTERY OF NATIVE HEART WITH UNSTABLE ANGINA PECTORIS (HCC): ICD-10-CM

## 2024-02-05 DIAGNOSIS — Z95.1 S/P CABG (CORONARY ARTERY BYPASS GRAFT): ICD-10-CM

## 2024-02-05 RX ORDER — ATORVASTATIN CALCIUM 80 MG/1
80 TABLET, FILM COATED ORAL DAILY
Qty: 90 TABLET | Refills: 3 | Status: SHIPPED | OUTPATIENT
Start: 2024-02-05 | End: 2024-02-05

## 2024-02-05 RX ORDER — ATORVASTATIN CALCIUM 80 MG/1
80 TABLET, FILM COATED ORAL DAILY
Qty: 90 TABLET | Refills: 3 | Status: SHIPPED | OUTPATIENT
Start: 2024-02-05

## 2024-02-05 NOTE — TELEPHONE ENCOUNTER
Patient informed and willing to increase lipitor.  Medication sent to [facility].  Labs ordered.  She VU.

## 2024-02-05 NOTE — TELEPHONE ENCOUNTER
----- Message from Tyrone Ram MD sent at 2/5/2024  1:32 PM EST -----  Labs are very good overall. LDL 73. Goal is < 70 and closer to 50. Given young age and CABG history I would increase lipitor to 80mg daily to be as aggressive as possible and lower LDL further. Repeat FLP and LFT's in 2 months if willing to increase dose. Thanks.

## 2024-02-05 NOTE — PROGRESS NOTES
function.      2. Ischemic cardiomyopathy: Resolved. Initial EF=35-40% but after surgical revascularization and med mgt her EF now back to normal 55% on 2/1/23 study. Continue coreg 6.25mg BID and lisinopril 5mg qd.      3. CAD, multiple vessel: See # 1 above     4. Lipids: I personally reviewed most recent lipids from 2/1/23. Well controlled and at goal and will continue current medical regimen.    5.  Obesity and DM: Referral to Dr. Izquierdo and [facility] clinic along with endo Dr. Rajan for help. If able to lose weight she will do much better clinically long-term.     Plan:  Labs reviewed in epic and discussed with patient.  Current medications reviewed.  Refills given as warranted.  Recommend going to Dr. Izquierdo's office for help with weight loss.  Your heart strength has returned back to normal.   You do have some blockages and you could have some chest pain.  If the pain gets more frequent or more intense then we can put you on nitroglycerin to help with the chest pain.  No cardiac testing at this time.   Risks and benefits of being on two blood thinners.  Recommend continuing with both blood thinners unless you have bleeding in the future.  Recommend seeing endocrinologist to help manage your diabetes.  Call [phone] to schedule a Lexiscan Stress Test   - test will look for blockages and the blood flow through the heart  - A small IV will be placed into your arm to administer a radioisotope (myoview) to visualize your heart..   - You will then have a waiting period to allow the tracer to be absorbed by the heart muscle. After the waiting period, we will take pictures of the blood flow to your heart muscle with the nuclear camera.   - Following your pictures, we will perform your stress test. We can do your stress test by having you walk on the treadmill or by giving you a medication (Lexiscan) which makes your body think it is exercising.   - We will monitor you before, during, and after your

## 2024-02-12 ENCOUNTER — OFFICE VISIT (OUTPATIENT)
Dept: CARDIOLOGY CLINIC | Age: 54
End: 2024-02-12
Payer: COMMERCIAL

## 2024-02-12 VITALS
WEIGHT: 293 LBS | SYSTOLIC BLOOD PRESSURE: 130 MMHG | HEART RATE: 81 BPM | DIASTOLIC BLOOD PRESSURE: 72 MMHG | OXYGEN SATURATION: 99 % | BODY MASS INDEX: 50.02 KG/M2 | HEIGHT: 64 IN

## 2024-02-12 DIAGNOSIS — I49.9 IRREGULAR HEART RATE: ICD-10-CM

## 2024-02-12 DIAGNOSIS — I25.5 ISCHEMIC CARDIOMYOPATHY: ICD-10-CM

## 2024-02-12 DIAGNOSIS — R63.5 WEIGHT GAIN: ICD-10-CM

## 2024-02-12 DIAGNOSIS — Z79.4 TYPE 2 DIABETES MELLITUS WITHOUT COMPLICATION, WITH LONG-TERM CURRENT USE OF INSULIN (HCC): ICD-10-CM

## 2024-02-12 DIAGNOSIS — E11.9 TYPE 2 DIABETES MELLITUS WITHOUT COMPLICATION, WITH LONG-TERM CURRENT USE OF INSULIN (HCC): ICD-10-CM

## 2024-02-12 DIAGNOSIS — R60.0 LOCALIZED EDEMA: ICD-10-CM

## 2024-02-12 DIAGNOSIS — R07.89 OTHER CHEST PAIN: ICD-10-CM

## 2024-02-12 DIAGNOSIS — I25.110 CORONARY ARTERY DISEASE INVOLVING NATIVE CORONARY ARTERY OF NATIVE HEART WITH UNSTABLE ANGINA PECTORIS (HCC): Primary | ICD-10-CM

## 2024-02-12 DIAGNOSIS — Z95.1 S/P THREE VESSEL CORONARY ARTERY BYPASS: ICD-10-CM

## 2024-02-12 PROCEDURE — 93000 ELECTROCARDIOGRAM COMPLETE: CPT | Performed by: INTERNAL MEDICINE

## 2024-02-12 PROCEDURE — 99214 OFFICE O/P EST MOD 30 MIN: CPT | Performed by: INTERNAL MEDICINE

## 2024-02-12 RX ORDER — INSULIN DEGLUDEC INJECTION 100 U/ML
INJECTION, SOLUTION SUBCUTANEOUS
COMMUNITY
Start: 2024-02-08

## 2024-02-21 ENCOUNTER — HOSPITAL ENCOUNTER (OUTPATIENT)
Dept: NON INVASIVE DIAGNOSTICS | Age: 54
Discharge: HOME | End: 2024-02-21
Payer: COMMERCIAL

## 2024-02-21 ENCOUNTER — HOSPITAL ENCOUNTER (OUTPATIENT)
Dept: NUCLEAR MEDICINE | Age: 54
Discharge: HOME | End: 2024-02-21
Payer: COMMERCIAL

## 2024-02-21 DIAGNOSIS — R07.89 OTHER CHEST PAIN: ICD-10-CM

## 2024-02-21 PROCEDURE — 3430000000 HC RX DIAGNOSTIC RADIOPHARMACEUTICAL: Performed by: INTERNAL MEDICINE

## 2024-02-21 PROCEDURE — A9502 TC99M TETROFOSMIN: HCPCS | Performed by: INTERNAL MEDICINE

## 2024-02-21 PROCEDURE — 6360000002 HC RX W HCPCS: Performed by: INTERNAL MEDICINE

## 2024-02-21 PROCEDURE — 93017 CV STRESS TEST TRACING ONLY: CPT

## 2024-02-21 PROCEDURE — 78452 HT MUSCLE IMAGE SPECT MULT: CPT

## 2024-02-21 RX ORDER — REGADENOSON 0.08 MG/ML
0.4 INJECTION, SOLUTION INTRAVENOUS
Status: COMPLETED | OUTPATIENT
Start: 2024-02-21 | End: 2024-02-21

## 2024-02-21 RX ADMIN — TETROFOSMIN 33 MILLICURIE: 1.38 INJECTION, POWDER, LYOPHILIZED, FOR SOLUTION INTRAVENOUS at 07:49

## 2024-02-21 RX ADMIN — REGADENOSON 0.4 MG: 0.08 INJECTION, SOLUTION INTRAVENOUS at 07:50

## 2024-02-21 NOTE — PROGRESS NOTES
Patient arrived to stress lab for Lexiscan/Myoview Stress test.  Patient was educated on procedure, all questions answered, and consent verified. Pt states she's had a hysterectomy, no need for pregnancy test. Pt aware of test being 2 days, agreeable to come back tomorrow for resting scan.

## 2024-02-21 NOTE — PROGRESS NOTES
Pt completed stress portion of cardiac stress test. Pt is discharged to nuclear department for final scan. Nuclear tech will remove PIV. Discharge instructions given to pt. Pt verbalizes understanding to discharge instructions.

## 2024-02-22 ENCOUNTER — HOSPITAL ENCOUNTER (OUTPATIENT)
Dept: NUCLEAR MEDICINE | Age: 54
Discharge: HOME | End: 2024-02-22
Payer: COMMERCIAL

## 2024-02-22 ENCOUNTER — TELEPHONE (OUTPATIENT)
Dept: CARDIOLOGY CLINIC | Age: 54
End: 2024-02-22

## 2024-02-22 DIAGNOSIS — R94.39 ABNORMAL STRESS TEST: ICD-10-CM

## 2024-02-22 DIAGNOSIS — I25.110 CORONARY ARTERY DISEASE INVOLVING NATIVE CORONARY ARTERY OF NATIVE HEART WITH UNSTABLE ANGINA PECTORIS (HCC): ICD-10-CM

## 2024-02-22 DIAGNOSIS — R07.89 OTHER CHEST PAIN: Primary | ICD-10-CM

## 2024-02-22 PROCEDURE — A9502 TC99M TETROFOSMIN: HCPCS | Performed by: INTERNAL MEDICINE

## 2024-02-22 PROCEDURE — 3430000000 HC RX DIAGNOSTIC RADIOPHARMACEUTICAL: Performed by: INTERNAL MEDICINE

## 2024-02-22 RX ADMIN — TETROFOSMIN 29.8 MILLICURIE: 1.38 INJECTION, POWDER, LYOPHILIZED, FOR SOLUTION INTRAVENOUS at 08:24

## 2024-02-22 NOTE — TELEPHONE ENCOUNTER
----- Message from Tyrone Ram MD sent at 2/22/2024  1:57 PM EST -----  I called and went over abnormal nuc study results personally with her. Given CP, hx CABG, and abnl nuc stress test I recommend left heart cath for definitive evaluation of coronary arteries and bypass grafts.  Risks, benefits, expectations, and alternative treatments were discussed.  Questions appropriately answered.  Cris KERLINE Medrano agrees to proceed and verbalized understanding. Please schedule asap and let me know who the interventional doc is so I can discuss case with him. She will be expecting call from office for scheduling procedure. Thanks.

## 2024-02-23 NOTE — TELEPHONE ENCOUNTER
HOLD insulin that morning and if she takes PM dose the night before-only take half  Rest of meds including aspirin and plavix with sip of water  No lotion/creams   NPO After midnight-patient informed and VU.

## 2024-02-23 NOTE — TELEPHONE ENCOUNTER
Procedure:  [facility] (CABG)  Doctor:  Dr. Graham ([place])  Date:  2/27/24  Time:  8am  Arrival:  6:30am  Reps:  n/a  Anesthesia:  n/a      Spoke with patient. Please have patient arrive to the main entrance of [facility] ([address] [place], [place] [phone]) and check in with the registration desk.  They will be directed to the Cath Lab.  Please call patient regarding medication instructions. Remind patient to be NPO after midnight (8 hours prior). Do not apply lotions/creams on skin the day of procedure.    DARLENE, FARAZ - FYI ONLY.

## 2024-02-27 ENCOUNTER — HOSPITAL ENCOUNTER (OUTPATIENT)
Dept: CARDIAC CATH/INVASIVE PROCEDURES | Age: 54
Discharge: HOME | End: 2024-02-27
Attending: INTERNAL MEDICINE | Admitting: INTERNAL MEDICINE
Payer: COMMERCIAL

## 2024-02-27 VITALS
RESPIRATION RATE: 14 BRPM | BODY MASS INDEX: 49.34 KG/M2 | WEIGHT: 289 LBS | OXYGEN SATURATION: 99 % | SYSTOLIC BLOOD PRESSURE: 94 MMHG | HEART RATE: 62 BPM | HEIGHT: 64 IN | DIASTOLIC BLOOD PRESSURE: 58 MMHG

## 2024-02-27 LAB
ANION GAP SERPL CALCULATED.3IONS-SCNC: 10 MMOL/L (ref 3–16)
BUN SERPL-MCNC: 15 MG/DL (ref 7–20)
CALCIUM SERPL-MCNC: 9.3 MG/DL (ref 8.3–10.6)
CHLORIDE SERPL-SCNC: 101 MMOL/L (ref 99–110)
CHOLEST SERPL-MCNC: 119 MG/DL (ref 0–199)
CO2 SERPL-SCNC: 28 MMOL/L (ref 21–32)
CREAT SERPL-MCNC: <0.5 MG/DL (ref 0.6–1.1)
DEPRECATED RDW RBC AUTO: 14.9 % (ref 12.4–15.4)
EKG ATRIAL RATE: 61 BPM
EKG DIAGNOSIS: NORMAL
EKG P AXIS: 36 DEGREES
EKG P-R INTERVAL: 138 MS
EKG Q-T INTERVAL: 446 MS
EKG QRS DURATION: 80 MS
EKG QTC CALCULATION (BAZETT): 448 MS
EKG R AXIS: -23 DEGREES
EKG T AXIS: 35 DEGREES
EKG VENTRICULAR RATE: 61 BPM
GFR SERPLBLD CREATININE-BSD FMLA CKD-EPI: >60 ML/MIN/{1.73_M2}
GLUCOSE BLD-MCNC: 109 MG/DL (ref 70–99)
GLUCOSE SERPL-MCNC: 121 MG/DL (ref 70–99)
HCT VFR BLD AUTO: 41.8 % (ref 36–48)
HDLC SERPL-MCNC: 41 MG/DL (ref 40–60)
HGB BLD-MCNC: 13.8 G/DL (ref 12–16)
INR PPP: 1.05 (ref 0.84–1.16)
LDLC SERPL CALC-MCNC: 64 MG/DL
MCH RBC QN AUTO: 28.3 PG (ref 26–34)
MCHC RBC AUTO-ENTMCNC: 32.9 G/DL (ref 31–36)
MCV RBC AUTO: 86.1 FL (ref 80–100)
PERFORMED ON: ABNORMAL
PLATELET # BLD AUTO: 238 K/UL (ref 135–450)
PMV BLD AUTO: 9.5 FL (ref 5–10.5)
POC ACT LR: 233 SEC
POC ACT LR: 240 SEC
POC ACT LR: 304 SEC
POTASSIUM SERPL-SCNC: 4 MMOL/L (ref 3.5–5.1)
PROTHROMBIN TIME: 13.7 SEC (ref 11.5–14.8)
RBC # BLD AUTO: 4.86 M/UL (ref 4–5.2)
SODIUM SERPL-SCNC: 139 MMOL/L (ref 136–145)
TRIGL SERPL-MCNC: 72 MG/DL (ref 0–150)
VLDLC SERPL CALC-MCNC: 14 MG/DL
WBC # BLD AUTO: 5.7 K/UL (ref 4–11)

## 2024-02-27 PROCEDURE — 80061 LIPID PANEL: CPT

## 2024-02-27 PROCEDURE — 85027 COMPLETE CBC AUTOMATED: CPT

## 2024-02-27 PROCEDURE — 2709999900 HC NON-CHARGEABLE SUPPLY: Performed by: INTERNAL MEDICINE

## 2024-02-27 PROCEDURE — 2580000003 HC RX 258: Performed by: INTERNAL MEDICINE

## 2024-02-27 PROCEDURE — 85610 PROTHROMBIN TIME: CPT

## 2024-02-27 PROCEDURE — C1894 INTRO/SHEATH, NON-LASER: HCPCS | Performed by: INTERNAL MEDICINE

## 2024-02-27 PROCEDURE — 6360000002 HC RX W HCPCS

## 2024-02-27 PROCEDURE — 85347 COAGULATION TIME ACTIVATED: CPT

## 2024-02-27 PROCEDURE — 2500000003 HC RX 250 WO HCPCS

## 2024-02-27 PROCEDURE — 6360000004 HC RX CONTRAST MEDICATION

## 2024-02-27 PROCEDURE — 93459 L HRT ART/GRFT ANGIO: CPT | Performed by: INTERNAL MEDICINE

## 2024-02-27 PROCEDURE — 99152 MOD SED SAME PHYS/QHP 5/>YRS: CPT | Performed by: INTERNAL MEDICINE

## 2024-02-27 PROCEDURE — 93459 L HRT ART/GRFT ANGIO: CPT

## 2024-02-27 PROCEDURE — C1769 GUIDE WIRE: HCPCS | Performed by: INTERNAL MEDICINE

## 2024-02-27 PROCEDURE — 93005 ELECTROCARDIOGRAM TRACING: CPT | Performed by: INTERNAL MEDICINE

## 2024-02-27 PROCEDURE — 80048 BASIC METABOLIC PNL TOTAL CA: CPT

## 2024-02-27 RX ORDER — HEPARIN SODIUM 1000 [USP'U]/ML
INJECTION, SOLUTION INTRAVENOUS; SUBCUTANEOUS
Status: COMPLETED | OUTPATIENT
Start: 2024-02-27 | End: 2024-02-27

## 2024-02-27 RX ORDER — MIDAZOLAM HYDROCHLORIDE 1 MG/ML
INJECTION INTRAMUSCULAR; INTRAVENOUS
Status: COMPLETED | OUTPATIENT
Start: 2024-02-27 | End: 2024-02-27

## 2024-02-27 RX ORDER — FENTANYL CITRATE 50 UG/ML
INJECTION, SOLUTION INTRAMUSCULAR; INTRAVENOUS
Status: COMPLETED | OUTPATIENT
Start: 2024-02-27 | End: 2024-02-27

## 2024-02-27 RX ORDER — ASPIRIN 325 MG
325 TABLET ORAL ONCE
Status: DISCONTINUED | OUTPATIENT
Start: 2024-02-27 | End: 2024-02-27

## 2024-02-27 RX ORDER — SODIUM CHLORIDE 0.9 % (FLUSH) 0.9 %
5-40 SYRINGE (ML) INJECTION PRN
Status: DISCONTINUED | OUTPATIENT
Start: 2024-02-27 | End: 2024-02-27 | Stop reason: HOSPADM

## 2024-02-27 RX ORDER — SODIUM CHLORIDE 9 MG/ML
INJECTION, SOLUTION INTRAVENOUS PRN
Status: DISCONTINUED | OUTPATIENT
Start: 2024-02-27 | End: 2024-02-27 | Stop reason: HOSPADM

## 2024-02-27 RX ORDER — ACETAMINOPHEN 325 MG/1
650 TABLET ORAL EVERY 4 HOURS PRN
Status: DISCONTINUED | OUTPATIENT
Start: 2024-02-27 | End: 2024-02-27 | Stop reason: HOSPADM

## 2024-02-27 RX ORDER — LORAZEPAM 0.5 MG/1
0.5 TABLET ORAL
Status: DISCONTINUED | OUTPATIENT
Start: 2024-02-27 | End: 2024-02-27 | Stop reason: HOSPADM

## 2024-02-27 RX ORDER — SODIUM CHLORIDE 0.9 % (FLUSH) 0.9 %
5-40 SYRINGE (ML) INJECTION EVERY 12 HOURS SCHEDULED
Status: DISCONTINUED | OUTPATIENT
Start: 2024-02-27 | End: 2024-02-27 | Stop reason: HOSPADM

## 2024-02-27 RX ORDER — ONDANSETRON 2 MG/ML
4 INJECTION INTRAMUSCULAR; INTRAVENOUS EVERY 6 HOURS PRN
Status: DISCONTINUED | OUTPATIENT
Start: 2024-02-27 | End: 2024-02-27 | Stop reason: HOSPADM

## 2024-02-27 RX ADMIN — FENTANYL CITRATE 25 MCG: 50 INJECTION, SOLUTION INTRAMUSCULAR; INTRAVENOUS at 09:13

## 2024-02-27 RX ADMIN — HEPARIN SODIUM 1000 UNITS: 1000 INJECTION, SOLUTION INTRAVENOUS; SUBCUTANEOUS at 10:21

## 2024-02-27 RX ADMIN — FENTANYL CITRATE 25 MCG: 50 INJECTION, SOLUTION INTRAMUSCULAR; INTRAVENOUS at 09:38

## 2024-02-27 RX ADMIN — FENTANYL CITRATE 25 MCG: 50 INJECTION, SOLUTION INTRAMUSCULAR; INTRAVENOUS at 09:25

## 2024-02-27 RX ADMIN — MIDAZOLAM HYDROCHLORIDE 1 MG: 1 INJECTION INTRAMUSCULAR; INTRAVENOUS at 10:21

## 2024-02-27 RX ADMIN — SODIUM CHLORIDE 100 ML/HR: 9 INJECTION, SOLUTION INTRAVENOUS at 08:01

## 2024-02-27 RX ADMIN — MIDAZOLAM HYDROCHLORIDE 1 MG: 1 INJECTION INTRAMUSCULAR; INTRAVENOUS at 10:13

## 2024-02-27 RX ADMIN — MIDAZOLAM HYDROCHLORIDE 1 MG: 1 INJECTION INTRAMUSCULAR; INTRAVENOUS at 09:15

## 2024-02-27 RX ADMIN — FENTANYL CITRATE 25 MCG: 50 INJECTION, SOLUTION INTRAMUSCULAR; INTRAVENOUS at 10:21

## 2024-02-27 RX ADMIN — FENTANYL CITRATE 25 MCG: 50 INJECTION, SOLUTION INTRAMUSCULAR; INTRAVENOUS at 10:03

## 2024-02-27 RX ADMIN — MIDAZOLAM HYDROCHLORIDE 1 MG: 1 INJECTION INTRAMUSCULAR; INTRAVENOUS at 08:45

## 2024-02-27 RX ADMIN — FENTANYL CITRATE 25 MCG: 50 INJECTION, SOLUTION INTRAMUSCULAR; INTRAVENOUS at 09:48

## 2024-02-27 RX ADMIN — MIDAZOLAM HYDROCHLORIDE 1 MG: 1 INJECTION INTRAMUSCULAR; INTRAVENOUS at 10:03

## 2024-02-27 RX ADMIN — MIDAZOLAM HYDROCHLORIDE 1 MG: 1 INJECTION INTRAMUSCULAR; INTRAVENOUS at 09:38

## 2024-02-27 RX ADMIN — HEPARIN SODIUM 5000 UNITS: 1000 INJECTION, SOLUTION INTRAVENOUS; SUBCUTANEOUS at 09:03

## 2024-02-27 RX ADMIN — FENTANYL CITRATE 25 MCG: 50 INJECTION, SOLUTION INTRAMUSCULAR; INTRAVENOUS at 09:00

## 2024-02-27 RX ADMIN — FENTANYL CITRATE 50 MCG: 50 INJECTION, SOLUTION INTRAMUSCULAR; INTRAVENOUS at 08:53

## 2024-02-27 RX ADMIN — HEPARIN SODIUM 1000 UNITS: 1000 INJECTION, SOLUTION INTRAVENOUS; SUBCUTANEOUS at 10:03

## 2024-02-27 NOTE — DISCHARGE INSTRUCTIONS
Cath Labs at  [facility]   Discharge Instructions        2/27/2024  Cris Medrano   Date of Birth 1970       Activity:  No driving for 24 hours.  In 24 hours you may remove dressing and shower, wash site gently with soap and water and leave open to air  Avoid submerging your arm in sitting water for 5 days.  Do not use your left hand for 24 hours, then  No lifting more than 5 pounds for 5 days.   No lotions, powders, or ointments near site for 5 days.   No work/school for 5 days unless instructed otherwise by your cardiologist.    Diet:   Resume previous diet, if a cardiac diet is specified you will receive a handout with  general guidelines.   Drink extra non-alcoholic/decaffienated fluids for first 24 hours after your procedure.    Arm Management:  If bleeding occurs from the site or a hematoma (lump) begins to increase in size, apply pressure directly over the site, call 911 to return to the hospital.    Special Instructions:  Report any coolness or numbness in the arm  Report any chills, fever, itching, red bumps or rash   Report any of the following to the MD: drainage from the site, redness and/or swelling at the site, increased tenderness at the site   If you are currently taking Metformin or Metformin combination medications for Diabetes, hold your dose for 48 hours after your procedure.  Consult your Cardiologist before taking any NSAIDS, vitamin supplements, estrogen, or estrogen plus progestin.  Do not stop taking Plavix, Brilinta or Effient, without first consulting your cardiologist.    Sedation Discharge Instructions:  For the next 24 hours do not drive a car, operate machinery, power tools or kitchen appliances.    Do not drink alcohol; including beer or wine.    Do not make any important decisions or sign any important papers.  For the next 24 hours you can expect drowsiness, light-headed or dizziness, nausea/ vomiting, inability to concentrate, fatigue and desire to sleep.  We strongly

## 2024-02-27 NOTE — PROGRESS NOTES
Patient/family given discharge instructions. Patient/family verbalize understanding of discharge instructions, all questions addressed, copy given to patient/family. Pt transferred to vehicle via wheelchair to be discharged home with family.  Left radial site remains unremarkable, armboard in place.  No c/o voiced.

## 2024-02-27 NOTE — PROCEDURES
noted, there is tenting noted in the midsegment and there is a branch which bifurcates from this vessel early which has proximal 60 to 70% stenosis.  PLV is a medium to large size vessel with proximal-mid 80 to 85% stenosis         BYPASS GRAFTS    LEO-LAD Atretic, 100% [id number] .       SVG-OM Widely patent although is small to medium in caliber, has 30 to 40% [id number] stenosis.       SVG-RCA Small vessel, ostial/proximal 50% stenosis, mid-distal 20 to 30% stenosis.               CONCLUSIONS:     Radial artery spasm, this made cannulation difficult, future procedures may require a groin approach and possibly general anesthesia.  2 out of 3 bypass grafts are patent however saphenous vein graft to RCA is to the PDA and there is a severe lesion in the PLV that is not protected.  Will consult with CT surgery regarding revascularization options which could include high risk redo surgery versus PCI.

## 2024-02-27 NOTE — H&P
Brief Pre-Op Note/Sedation Assessment      Cris Medrano  1970  [id number]  8:46 AM    Planned Procedure: Cardiac Catheterization Procedure  Post Procedure Plan: Return to same level of care  Consent: I have discussed with the patient and/or the patient representative the indication, alternatives, and the possible risks and/or complications of the planned procedure and the anesthesia methods. The patient and/or patient representative appear to understand and agree to proceed.    DISCUSSION OF CARDIAC CATHETERIZATION PROCEDURES: The procedures, indications, risks and alternatives have been discussed with the patient and, as appropriate, with the patient's guardian . Risks discussed included, but are not limited to, bleeding, development of blood clots/emboli, damage to blood vessels, renal failure, malignant cardiac arrhythmias, stroke, heart attack, emergent coronary bypass surgery, death, dye allergy.  The patient (and guardian as appropriate) expressed understanding of the aforementioned and wished to proceed.        Chief Complaint:   Chest Pain/Pressure      Indications for Cath Procedure:  Presentation:  Worsening Angina  2.  Anginal Classification within 2 weeks:  CCS III - Symptoms with everyday living activities, i.e., moderate limitation  3.  Angina Symptoms Assessment:  Typical Chest Pain  4.  Heart Failure Class within last 2 weeks:  No symptoms  5.  Cardiovascular Instability:  No    Prior Ischemic Workup/Eval:  Pre-Procedural Medications: Yes: Aspirin, Beta Blockers, and STATIN  2.   Stress Test Completed?  Yes:  Stress or Imaging Studies Performed (within ANY time period):   Type:  Stress Nuclear  Results:  Positive:  Myocardial Perfusion Defects (Nuclear) Extent of Ischemia:  High Risk (>3% annual death or MI)    Does Patient need surgery?  Cath Valve Surgery:  No    Pre-Procedure Medical History:  Vital Signs:  Ht 1.626 m (5' 4\")   Wt 131.1 kg (289 lb)   [facility] 12/13/2016   BMI 49.61 kg/m²

## 2024-02-28 ENCOUNTER — TELEPHONE (OUTPATIENT)
Dept: CARDIOLOGY CLINIC | Age: 54
End: 2024-02-28

## 2024-02-28 NOTE — TELEPHONE ENCOUNTER
----- Message from Evelio Graham MD sent at 2/27/2024  5:15 PM EST -----  Let patient know their lipid test is normal, continue current meds, no new orders or changes at this time.  Thanks.

## 2024-03-01 ENCOUNTER — TELEPHONE (OUTPATIENT)
Dept: CARDIOLOGY CLINIC | Age: 54
End: 2024-03-01

## 2024-03-01 NOTE — TELEPHONE ENCOUNTER
Let pt know, overall ht function was normal on cath, and as such, stents would be reasonable to consider due to blockages noted but would not be urgent.  Lets get appt in office to discuss further on this. Thank you.

## 2024-03-01 NOTE — TELEPHONE ENCOUNTER
----- Message from Evelio Graham MD sent at 2/27/2024 10:55 AM EST -----  Just wanted to let you know I did the cath on your patient, unfortunately her LIMA is atretic, she has at least 2 territories which are not protected from the bypasses, we potentially could offer her revascularization for these territories but it would be high risk and may or may not be durable.  I would have her follow-up with CT surgery as well to discuss options.  Just wanted to let you know, call me with any questions.  Thanks

## 2024-04-22 NOTE — DISCHARGE INSTRUCTIONS
Attempted to schedule patient per pulm referral, left message.   Current dressing can stay on until vac dressing can be reapplied. Take home with a few silver nitrate sticks just in case other bleeding gets stirred up with a dressing change. Hold coreg until Monday. All other meds can be continued as before admission.

## 2024-07-11 ENCOUNTER — HOSPITAL ENCOUNTER (OUTPATIENT)
Age: 54
Discharge: HOME | End: 2024-07-11
Payer: COMMERCIAL

## 2024-07-11 LAB
ANION GAP SERPL CALCULATED.3IONS-SCNC: 12 MMOL/L (ref 3–16)
B-HCG SERPL EIA 3RD IS-ACNC: <5 MIU/ML
BUN SERPL-MCNC: 21 MG/DL (ref 7–20)
CALCIUM SERPL-MCNC: 9.6 MG/DL (ref 8.3–10.6)
CHLORIDE SERPL-SCNC: 103 MMOL/L (ref 99–110)
CHOLEST SERPL-MCNC: 94 MG/DL (ref 0–199)
CO2 SERPL-SCNC: 26 MMOL/L (ref 21–32)
CREAT SERPL-MCNC: 0.5 MG/DL (ref 0.6–1.1)
DEPRECATED RDW RBC AUTO: 14.7 % (ref 12.4–15.4)
GFR SERPLBLD CREATININE-BSD FMLA CKD-EPI: >90 ML/MIN/{1.73_M2}
GLUCOSE SERPL-MCNC: 116 MG/DL (ref 70–99)
HCT VFR BLD AUTO: 43.2 % (ref 36–48)
HDLC SERPL-MCNC: 37 MG/DL (ref 40–60)
HGB BLD-MCNC: 14.5 G/DL (ref 12–16)
LDLC SERPL CALC-MCNC: 41 MG/DL
MCH RBC QN AUTO: 29.9 PG (ref 26–34)
MCHC RBC AUTO-ENTMCNC: 33.6 G/DL (ref 31–36)
MCV RBC AUTO: 88.8 FL (ref 80–100)
PLATELET # BLD AUTO: 214 K/UL (ref 135–450)
PMV BLD AUTO: 12 FL (ref 5–10.5)
POTASSIUM SERPL-SCNC: 4.4 MMOL/L (ref 3.5–5.1)
RBC # BLD AUTO: 4.86 M/UL (ref 4–5.2)
SODIUM SERPL-SCNC: 141 MMOL/L (ref 136–145)
TRIGL SERPL-MCNC: 79 MG/DL (ref 0–150)
VLDLC SERPL CALC-MCNC: 16 MG/DL
WBC # BLD AUTO: 6.2 K/UL (ref 4–11)

## 2024-07-11 PROCEDURE — 85027 COMPLETE CBC AUTOMATED: CPT

## 2024-07-11 PROCEDURE — 84702 CHORIONIC GONADOTROPIN TEST: CPT

## 2024-07-11 PROCEDURE — 80048 BASIC METABOLIC PNL TOTAL CA: CPT

## 2024-07-11 PROCEDURE — 80061 LIPID PANEL: CPT

## 2024-08-07 ENCOUNTER — HOSPITAL ENCOUNTER (OUTPATIENT)
Age: 54
Discharge: HOME | End: 2024-08-07
Payer: COMMERCIAL

## 2024-08-07 LAB
B-HCG SERPL EIA 3RD IS-ACNC: <5 MIU/ML
DEPRECATED RDW RBC AUTO: 15.1 % (ref 12.4–15.4)
HCT VFR BLD AUTO: 40.5 % (ref 36–48)
HGB BLD-MCNC: 14 G/DL (ref 12–16)
MCH RBC QN AUTO: 30.5 PG (ref 26–34)
MCHC RBC AUTO-ENTMCNC: 34.5 G/DL (ref 31–36)
MCV RBC AUTO: 88.5 FL (ref 80–100)
PLATELET # BLD AUTO: 178 K/UL (ref 135–450)
PMV BLD AUTO: 11 FL (ref 5–10.5)
RBC # BLD AUTO: 4.58 M/UL (ref 4–5.2)
WBC # BLD AUTO: 4.9 K/UL (ref 4–11)

## 2024-08-07 PROCEDURE — 80048 BASIC METABOLIC PNL TOTAL CA: CPT

## 2024-08-07 PROCEDURE — 80061 LIPID PANEL: CPT

## 2024-08-07 PROCEDURE — 84702 CHORIONIC GONADOTROPIN TEST: CPT

## 2024-08-07 PROCEDURE — 85027 COMPLETE CBC AUTOMATED: CPT

## 2024-08-08 LAB
ANION GAP SERPL CALCULATED.3IONS-SCNC: 15 MMOL/L (ref 3–16)
BUN SERPL-MCNC: 17 MG/DL (ref 7–20)
CALCIUM SERPL-MCNC: 9.4 MG/DL (ref 8.3–10.6)
CHLORIDE SERPL-SCNC: 102 MMOL/L (ref 99–110)
CHOLEST SERPL-MCNC: 112 MG/DL (ref 0–199)
CO2 SERPL-SCNC: 23 MMOL/L (ref 21–32)
CREAT SERPL-MCNC: 0.6 MG/DL (ref 0.6–1.1)
GFR SERPLBLD CREATININE-BSD FMLA CKD-EPI: >90 ML/MIN/{1.73_M2}
GLUCOSE SERPL-MCNC: 112 MG/DL (ref 70–99)
HDLC SERPL-MCNC: 40 MG/DL (ref 40–60)
LDLC SERPL CALC-MCNC: 59 MG/DL
POTASSIUM SERPL-SCNC: 4.9 MMOL/L (ref 3.5–5.1)
SODIUM SERPL-SCNC: 140 MMOL/L (ref 136–145)
TRIGL SERPL-MCNC: 64 MG/DL (ref 0–150)
VLDLC SERPL CALC-MCNC: 13 MG/DL

## 2024-08-13 DIAGNOSIS — Z95.1 S/P CABG (CORONARY ARTERY BYPASS GRAFT): ICD-10-CM

## 2024-08-15 RX ORDER — CLOPIDOGREL BISULFATE 75 MG/1
75 TABLET ORAL DAILY
Qty: 90 TABLET | Refills: 0 | Status: SHIPPED | OUTPATIENT
Start: 2024-08-15

## 2024-10-12 DIAGNOSIS — Z95.1 S/P CABG (CORONARY ARTERY BYPASS GRAFT): ICD-10-CM

## 2024-10-14 RX ORDER — LISINOPRIL 5 MG/1
5 TABLET ORAL DAILY
Qty: 90 TABLET | Refills: 0 | OUTPATIENT
Start: 2024-10-14

## 2024-10-14 RX ORDER — CARVEDILOL 6.25 MG/1
TABLET ORAL
Qty: 180 TABLET | Refills: 0 | OUTPATIENT
Start: 2024-10-14

## 2024-10-14 RX ORDER — ASPIRIN 81 MG/1
81 TABLET, COATED ORAL DAILY
Qty: 90 TABLET | Refills: 0 | OUTPATIENT
Start: 2024-10-14

## 2024-10-14 NOTE — TELEPHONE ENCOUNTER
LOV 2/12/2024    Spoke with patient and she is following with [facility] due to insurance network.  She is very thankful to have had Dr. Ram.

## (undated) DEVICE — SURGICAL PROCEDURE PACK IV U-BAR

## (undated) DEVICE — CANISTER NEG PRSS 500ML TBNG CLMP CONN W/O GEL DISP INFOVAC

## (undated) DEVICE — YANKAUER,OPEN TIP,W/O VENT,STERILE: Brand: MEDLINE INDUSTRIES, INC.

## (undated) DEVICE — Device